# Patient Record
Sex: FEMALE | Race: WHITE | NOT HISPANIC OR LATINO | Employment: OTHER | URBAN - METROPOLITAN AREA
[De-identification: names, ages, dates, MRNs, and addresses within clinical notes are randomized per-mention and may not be internally consistent; named-entity substitution may affect disease eponyms.]

---

## 2018-09-25 ENCOUNTER — OFFICE VISIT (OUTPATIENT)
Dept: OBGYN CLINIC | Age: 53
End: 2018-09-25
Payer: MEDICARE

## 2018-09-25 VITALS
HEIGHT: 64 IN | DIASTOLIC BLOOD PRESSURE: 82 MMHG | SYSTOLIC BLOOD PRESSURE: 132 MMHG | BODY MASS INDEX: 30.48 KG/M2 | WEIGHT: 178.5 LBS

## 2018-09-25 DIAGNOSIS — Z12.31 ENCOUNTER FOR SCREENING MAMMOGRAM FOR MALIGNANT NEOPLASM OF BREAST: ICD-10-CM

## 2018-09-25 DIAGNOSIS — N95.1 CLIMACTERIC: ICD-10-CM

## 2018-09-25 DIAGNOSIS — Z01.411 ENCOUNTER FOR GYNECOLOGICAL EXAMINATION WITH ABNORMAL FINDING: Primary | ICD-10-CM

## 2018-09-25 PROBLEM — Z01.419 ENCOUNTER FOR GYNECOLOGICAL EXAMINATION WITHOUT ABNORMAL FINDING: Status: ACTIVE | Noted: 2018-09-25

## 2018-09-25 PROCEDURE — G0101 CA SCREEN;PELVIC/BREAST EXAM: HCPCS | Performed by: NURSE PRACTITIONER

## 2018-09-25 NOTE — PROGRESS NOTES
Assessment/Plan:    No problem-specific Assessment & Plan notes found for this encounter  Diagnoses and all orders for this visit:    Encounter for gynecological examination with abnormal finding    Climacteric    Encounter for screening mammogram for malignant neoplasm of breast  -     Mammo screening bilateral w 3d & cad; Future    Other orders  -     Naltrexone (VIVITROL IM); Inject into a muscle      Call as needed, encouraged calcium/vit D in her diet, all questions answered      Subjective:      Patient ID: Jimena Damon is a 47 y o  female  Pleasant 47 y o  menopausal female here with jessica for annual exam  She is suffering from intense hot flashes for which she has tried remifemin, clonidine, gabapentin, SSRIs  Would like to have her hormones tested  States she has gained 50 lbs since her hysterectomy which she had done for bleeding in 2014  She has seen her PCP for this and she states nothing was found  Denies history of abnormal pap smears, no further paps  Denies vaginal issues  Denies pelvic pain  Hx of DCIS with 3 surgeries  Has not had a mammo since 2014  The following portions of the patient's history were reviewed and updated as appropriate:   She  has a past medical history of Anxiety; Arthritis; Bleeding disorder ([address] ); Breast cancer ([address] ); Chicken pox; Colon cancer ([address] ); Depression; Endometriosis; History of blood transfusion; Migraines; and Seizures ([address] )  She   Patient Active Problem List    Diagnosis Date Noted    Climacteric 09/25/2018    Encounter for gynecological examination without abnormal finding 09/25/2018     She  has a past surgical history that includes Total hip arthroplasty; Dilation and curettage of uterus; Colectomy; Sinus surgery; Hysterectomy; and Breast surgery  Her family history includes Heart disease in her father  She  reports that she has been smoking Cigarettes  She has been smoking about 1 00 pack per day   She has never used smokeless tobacco  She reports that she does not drink alcohol or use drugs  Current Outpatient Prescriptions   Medication Sig Dispense Refill    Naltrexone (VIVITROL IM) Inject into a muscle       No current facility-administered medications for this visit  She is allergic to penicillins  OB History    Para Term  AB Living   3 3       2   SAB TAB Ectopic Multiple Live Births           3      # Outcome Date GA Lbr Jonas/2nd Weight Sex Delivery Anes PTL Lv   3 Para            2 Para            1 Para                 Disabled  Relocated from [facility]  Review of Systems   Constitutional: Negative for activity change, chills, fatigue, fever and unexpected weight change  HENT: Negative for mouth sores and trouble swallowing  Respiratory: Negative for shortness of breath  Gastrointestinal: Negative for anal bleeding, blood in stool, constipation and diarrhea  Genitourinary: Negative for difficulty urinating, dysuria, genital sores and hematuria  Neurological: Negative for weakness  Psychiatric/Behavioral: Negative for confusion and self-injury  Objective:      /82 (BP Location: Right arm, Patient Position: Sitting, Cuff Size: Standard)   Ht 5' 4" (1 626 m)   Wt 81 kg (178 lb 8 oz)   LMP  (LMP Unknown)   Breastfeeding? No   BMI 30 64 kg/m²          Physical Exam   Constitutional: She appears well-developed and well-nourished  No distress  HENT:   Head: Normocephalic  Neck: Normal range of motion  Neck supple  Pulmonary/Chest: Effort normal  Right breast exhibits no inverted nipple, no mass, no nipple discharge, no skin change and no tenderness  Left breast exhibits no inverted nipple, no mass, no nipple discharge, no skin change and no tenderness  Breasts are symmetrical    Abdominal: Soft  Genitourinary: No breast swelling, tenderness, discharge or bleeding  Pelvic exam was performed with patient supine  No labial fusion   There is no rash, tenderness, lesion or injury on the right labia  There is no rash, tenderness, lesion or injury on the left labia  Right adnexum displays no mass, no tenderness and no fullness  Left adnexum displays no mass, no tenderness and no fullness  No erythema or tenderness in the vagina  No foreign body in the vagina  No signs of injury around the vagina  No vaginal discharge found  Genitourinary Comments: Cx/uterus absent   Lymphadenopathy:        Right: No inguinal adenopathy present  Left: No inguinal adenopathy present

## 2018-09-25 NOTE — PATIENT INSTRUCTIONS
Menopause   WHAT YOU NEED TO KNOW:   What is menopause? Menopause is a normal stage in a woman's life when her monthly periods stop  Menopause starts when the ovaries slowly stop making the female hormones estrogen and progesterone  After menopause, a woman is no longer able to become pregnant  A woman who has not had a period for a full year after the age of 39 is considered to be in menopause  Perimenopause is a stage before menopause that may cause signs and symptoms similar to menopause  Perimenopause can last an average of 4 to 5 years  What are the signs and symptoms of menopause? The signs and symptoms of menopause can be different from woman to woman:  · Menstrual period changes such as skipped periods or periods that are closer together, or lighter or heavier than usual    · Hot flashes (feeling warm, flushed, and sweaty)     · Mood changes such as irritability or decreased desire to have sex    · Breast changes such as tenderness or pain    · Hair changes such as thinning hair or increased hair on your face    · Vaginal changes such as increased dryness     · Urinary changes such as increased urinary tract infections (UTIs) or urgency (feeling that you need to urinate right away)    · Other symptoms such as headaches, trouble sleeping, fatigue, or heart palpitations (strong, fast heartbeats)  What do I need to know about menopause? · You can still get pregnant while you have periods  Continue to use birth control if you do not want to get pregnant  You may need to use birth control until it has been 1 year since your periods stopped  Ask your healthcare provider when you can stop using birth control to prevent pregnancy  · Hormone replacement therapy can be used to treat symptoms of menopause  Hormone replacement therapy (HRT) is medicine that replaces your low hormone levels  HRT contains estrogen and sometimes progestin  HRT has benefits and risks   HRT decreases your risk for bone fractures by helping to prevent osteoporosis  HRT also protects you from colon cancer  HRT may increase your risk for breast cancer, blood clots, heart disease, and stroke  Ask your healthcare provider if HRT is right for you  How can I live a healthy lifestyle during and after menopause? After menopause, your risk for heart disease and bone loss increases  Ask about these and other ways to stay healthy:  · Exercise regularly  Exercise helps you maintain a healthy weight  Exercise can also help to control your blood pressure and cholesterol levels  Include weight-bearing exercise for strong bones  Ask your healthcare provider about the best exercise plan for you  · Eat a variety of healthy foods  Include fruits, vegetables, whole grains (whole-wheat bread, pasta, and cereals), low-fat dairy, and lean protein foods (beans, poultry, and fish)  Limit foods high in sodium (salt)  Ask your healthcare provider for more information about a meal plan that is right for you  · Maintain a healthy weight  Check with your healthcare provider before you start any weight loss program      · Take supplements as directed  You may need extra calcium and vitamin D to help prevent osteoporosis  · Limit alcohol and caffeine  Alcohol and caffeine may worsen your symptoms  · Do not smoke  If you smoke, it is never too late to quit  You are more likely to have a heart attack, lung disease, blood clots, and cancer if you smoke  Ask your healthcare provider for information if you need help quitting  When should I contact my healthcare provider? · You have vaginal bleeding after menopause  · You have questions or concerns about your condition or care  CARE AGREEMENT:   You have the right to help plan your care  Learn about your health condition and how it may be treated  Discuss treatment options with your caregivers to decide what care you want to receive  You always have the right to refuse treatment   The above information is an  only  It is not intended as medical advice for individual conditions or treatments  Talk to your doctor, nurse or pharmacist before following any medical regimen to see if it is safe and effective for you  © 2017 [address] Information is for End User's use only and may not be sold, redistributed or otherwise used for commercial purposes  All illustrations and images included in CareNotes® are the copyrighted property of A D [employer] M [employer]  or [employer]

## 2018-10-23 ENCOUNTER — HOSPITAL ENCOUNTER (OUTPATIENT)
Dept: MAMMOGRAPHY | Facility: CLINIC | Age: 53
Discharge: HOME | End: 2018-10-23
Payer: MEDICARE

## 2018-10-23 DIAGNOSIS — Z12.31 ENCOUNTER FOR SCREENING MAMMOGRAM FOR MALIGNANT NEOPLASM OF BREAST: ICD-10-CM

## 2018-10-23 PROCEDURE — 77063 BREAST TOMOSYNTHESIS BI: CPT

## 2018-10-23 PROCEDURE — 77067 SCR MAMMO BI INCL CAD: CPT

## 2018-10-29 PROCEDURE — 99284 EMERGENCY DEPT VISIT MOD MDM: CPT

## 2018-10-30 ENCOUNTER — APPOINTMENT (EMERGENCY)
Dept: CT IMAGING | Facility: HOSPITAL | Age: 53
End: 2018-10-30
Payer: MEDICARE

## 2018-10-30 ENCOUNTER — HOSPITAL ENCOUNTER (EMERGENCY)
Facility: HOSPITAL | Age: 53
Discharge: HOME | End: 2018-10-30
Attending: EMERGENCY MEDICINE | Admitting: EMERGENCY MEDICINE
Payer: MEDICARE

## 2018-10-30 VITALS
TEMPERATURE: 97.9 F | BODY MASS INDEX: 30.65 KG/M2 | RESPIRATION RATE: 18 BRPM | DIASTOLIC BLOOD PRESSURE: 95 MMHG | WEIGHT: 178.57 LBS | HEART RATE: 83 BPM | OXYGEN SATURATION: 98 % | SYSTOLIC BLOOD PRESSURE: 137 MMHG

## 2018-10-30 DIAGNOSIS — R51.9 HEADACHE: Primary | ICD-10-CM

## 2018-10-30 LAB
ANION GAP SERPL CALCULATED.3IONS-SCNC: 8 MMOL/L (ref 4–13)
APTT PPP: 33 SECONDS (ref 24–36)
ATRIAL RATE: 73 BPM
BASOPHILS # BLD AUTO: 0.07 THOUSANDS/ΜL (ref 0–0.1)
BASOPHILS NFR BLD AUTO: 1 % (ref 0–1)
BUN SERPL-MCNC: 13 MG/DL (ref 5–25)
CALCIUM SERPL-MCNC: 9.6 MG/DL (ref 8.3–10.1)
CHLORIDE SERPL-SCNC: 103 MMOL/L (ref 100–108)
CO2 SERPL-SCNC: 28 MMOL/L (ref 21–32)
CREAT SERPL-MCNC: 0.81 MG/DL (ref 0.6–1.3)
EOSINOPHIL # BLD AUTO: 0.59 THOUSAND/ΜL (ref 0–0.61)
EOSINOPHIL NFR BLD AUTO: 4 % (ref 0–6)
ERYTHROCYTE [DISTWIDTH] IN BLOOD BY AUTOMATED COUNT: 12.8 % (ref 11.6–15.1)
GFR SERPL CREATININE-BSD FRML MDRD: 84 ML/MIN/1.73SQ M
GLUCOSE SERPL-MCNC: 102 MG/DL (ref 65–140)
HCT VFR BLD AUTO: 44.8 % (ref 34.8–46.1)
HGB BLD-MCNC: 14.7 G/DL (ref 11.5–15.4)
IMM GRANULOCYTES # BLD AUTO: 0.06 THOUSAND/UL (ref 0–0.2)
IMM GRANULOCYTES NFR BLD AUTO: 0 % (ref 0–2)
INR PPP: 0.96 (ref 0.86–1.17)
LYMPHOCYTES # BLD AUTO: 5.58 THOUSANDS/ΜL (ref 0.6–4.47)
LYMPHOCYTES NFR BLD AUTO: 39 % (ref 14–44)
MCH RBC QN AUTO: 29.5 PG (ref 26.8–34.3)
MCHC RBC AUTO-ENTMCNC: 32.8 G/DL (ref 31.4–37.4)
MCV RBC AUTO: 90 FL (ref 82–98)
MONOCYTES # BLD AUTO: 0.92 THOUSAND/ΜL (ref 0.17–1.22)
MONOCYTES NFR BLD AUTO: 6 % (ref 4–12)
NEUTROPHILS # BLD AUTO: 7.23 THOUSANDS/ΜL (ref 1.85–7.62)
NEUTS SEG NFR BLD AUTO: 50 % (ref 43–75)
NRBC BLD AUTO-RTO: 0 /100 WBCS
P AXIS: 58 DEGREES
PLATELET # BLD AUTO: 307 THOUSANDS/UL (ref 149–390)
PMV BLD AUTO: 9.5 FL (ref 8.9–12.7)
POTASSIUM SERPL-SCNC: 3.9 MMOL/L (ref 3.5–5.3)
PR INTERVAL: 148 MS
PROTHROMBIN TIME: 12.7 SECONDS (ref 11.8–14.2)
QRS AXIS: 55 DEGREES
QRSD INTERVAL: 74 MS
QT INTERVAL: 422 MS
QTC INTERVAL: 464 MS
RBC # BLD AUTO: 4.98 MILLION/UL (ref 3.81–5.12)
SODIUM SERPL-SCNC: 139 MMOL/L (ref 136–145)
T WAVE AXIS: 65 DEGREES
VENTRICULAR RATE: 73 BPM
WBC # BLD AUTO: 14.45 THOUSAND/UL (ref 4.31–10.16)

## 2018-10-30 PROCEDURE — 85610 PROTHROMBIN TIME: CPT | Performed by: EMERGENCY MEDICINE

## 2018-10-30 PROCEDURE — 70450 CT HEAD/BRAIN W/O DYE: CPT

## 2018-10-30 PROCEDURE — 93010 ELECTROCARDIOGRAM REPORT: CPT | Performed by: INTERNAL MEDICINE

## 2018-10-30 PROCEDURE — 36415 COLL VENOUS BLD VENIPUNCTURE: CPT | Performed by: EMERGENCY MEDICINE

## 2018-10-30 PROCEDURE — 85025 COMPLETE CBC W/AUTO DIFF WBC: CPT | Performed by: EMERGENCY MEDICINE

## 2018-10-30 PROCEDURE — 96361 HYDRATE IV INFUSION ADD-ON: CPT

## 2018-10-30 PROCEDURE — 96375 TX/PRO/DX INJ NEW DRUG ADDON: CPT

## 2018-10-30 PROCEDURE — 80048 BASIC METABOLIC PNL TOTAL CA: CPT | Performed by: EMERGENCY MEDICINE

## 2018-10-30 PROCEDURE — 93005 ELECTROCARDIOGRAM TRACING: CPT

## 2018-10-30 PROCEDURE — 85730 THROMBOPLASTIN TIME PARTIAL: CPT | Performed by: EMERGENCY MEDICINE

## 2018-10-30 PROCEDURE — 96374 THER/PROPH/DIAG INJ IV PUSH: CPT

## 2018-10-30 PROCEDURE — 96372 THER/PROPH/DIAG INJ SC/IM: CPT

## 2018-10-30 RX ORDER — OLANZAPINE 10 MG/1
10 INJECTION, POWDER, LYOPHILIZED, FOR SOLUTION INTRAMUSCULAR ONCE
Status: COMPLETED | OUTPATIENT
Start: 2018-10-30 | End: 2018-10-30

## 2018-10-30 RX ORDER — DIPHENHYDRAMINE HYDROCHLORIDE 50 MG/ML
50 INJECTION INTRAMUSCULAR; INTRAVENOUS ONCE
Status: COMPLETED | OUTPATIENT
Start: 2018-10-30 | End: 2018-10-30

## 2018-10-30 RX ORDER — KETOROLAC TROMETHAMINE 30 MG/ML
15 INJECTION, SOLUTION INTRAMUSCULAR; INTRAVENOUS ONCE
Status: COMPLETED | OUTPATIENT
Start: 2018-10-30 | End: 2018-10-30

## 2018-10-30 RX ORDER — METOCLOPRAMIDE HYDROCHLORIDE 5 MG/ML
10 INJECTION INTRAMUSCULAR; INTRAVENOUS ONCE
Status: COMPLETED | OUTPATIENT
Start: 2018-10-30 | End: 2018-10-30

## 2018-10-30 RX ADMIN — SODIUM CHLORIDE 1000 ML: 0.9 INJECTION, SOLUTION INTRAVENOUS at 02:31

## 2018-10-30 RX ADMIN — KETOROLAC TROMETHAMINE 15 MG: 30 INJECTION, SOLUTION INTRAMUSCULAR at 02:23

## 2018-10-30 RX ADMIN — OLANZAPINE 10 MG: 10 INJECTION, POWDER, FOR SOLUTION INTRAMUSCULAR at 02:47

## 2018-10-30 RX ADMIN — METOCLOPRAMIDE 10 MG: 5 INJECTION, SOLUTION INTRAMUSCULAR; INTRAVENOUS at 02:24

## 2018-10-30 RX ADMIN — DIPHENHYDRAMINE HYDROCHLORIDE 50 MG: 50 INJECTION, SOLUTION INTRAMUSCULAR; INTRAVENOUS at 02:26

## 2018-10-30 NOTE — ED PROVIDER NOTES
History  Chief Complaint   Patient presents with    Headache     per pt-chronic severe head pain-worsening over the past 10 hours  No relief from prescribed medications  Pt notes small amount of blood from ears this morning  Gradual onset worsening of diffuse HA  Has HA every day for last 20 years  No relief from home "usual medications" for HA  No recent trauma  No neck stiffness  No neuro deficit or complaint  No fever  No numbness or weakness  Currently symptomatic  Previously obtained relief from ketamine infusions  Had been on narcotics for approx 15 years but recently discontinued use of narcotics and gets vivitrol injections  Headache   Associated symptoms: no dizziness, no fatigue, no fever, no numbness, no seizures and no weakness        Prior to Admission Medications   Prescriptions Last Dose Informant Patient Reported? Taking? Naltrexone (VIVITROL IM)   Yes No   Sig: Inject into a muscle      Facility-Administered Medications: None       Past Medical History:   Diagnosis Date    Anxiety     Arthritis     Bleeding disorder (HCC)     Breast cancer (HCC)     Chicken pox     [facility]     Depression     Endometriosis     History of blood transfusion     Migraines     Seizures ([facility] )        Past Surgical History:   Procedure Laterality Date    BREAST SURGERY      COLECTOMY      DILATION AND CURETTAGE OF UTERUS      HYSTERECTOMY      SINUS SURGERY      TOTAL HIP ARTHROPLASTY         Family History   Problem Relation Age of Onset    Heart disease Father      I have reviewed and agree with the history as documented  Social History   Substance Use Topics    Smoking status: Current Every Day Smoker     Packs/day: 1 00     Types: Cigarettes    Smokeless tobacco: Never Used    Alcohol use No        Review of Systems   Constitutional: Negative for activity change, fatigue and fever  Neurological: Positive for headaches   Negative for dizziness, tremors, seizures, syncope, facial asymmetry, speech difficulty, weakness, light-headedness and numbness  All other systems reviewed and are negative  Physical Exam  Physical Exam   Constitutional: She is oriented to person, place, and time  She appears well-developed and well-nourished  No distress  HENT:   Head: Normocephalic and atraumatic  Eyes: Pupils are equal, round, and reactive to light  Conjunctivae and EOM are normal    Neck: Normal range of motion  Neck supple  No JVD present  Cardiovascular: Normal rate, regular rhythm, normal heart sounds and intact distal pulses  Pulmonary/Chest: Effort normal and breath sounds normal  No stridor  Musculoskeletal: Normal range of motion  She exhibits no edema, tenderness or deformity  Neurological: She is alert and oriented to person, place, and time  No cranial nerve deficit or sensory deficit  She exhibits normal muscle tone  Coordination normal    Skin: Skin is warm and dry  Capillary refill takes less than 2 seconds  No rash noted  She is not diaphoretic  No erythema  No pallor  Nursing note and vitals reviewed        Vital Signs  ED Triage Vitals [10/29/18 2147]   Temperature Pulse Respirations Blood Pressure SpO2   97 9 °F (36 6 °C) 85 18 133/91 98 %      Temp Source Heart Rate Source Patient Position - Orthostatic VS BP Location FiO2 (%)   Oral Monitor Sitting Left arm --      Pain Score       Worst Possible Pain           Vitals:    10/29/18 2147 10/30/18 0214 10/30/18 0232 10/30/18 0336   BP: 133/91 128/86 128/86 137/95   Pulse: 85 74 81 83   Patient Position - Orthostatic VS: Sitting Lying Lying Lying       Visual Acuity  Visual Acuity      Most Recent Value   L Pupil Size (mm)  2   R Pupil Size (mm)  2          ED Medications  Medications   sodium chloride 0 9 % bolus 1,000 mL (0 mL Intravenous Stopped 10/30/18 0402)   metoclopramide (REGLAN) injection 10 mg (10 mg Intravenous Given 10/30/18 0224)   diphenhydrAMINE (BENADRYL) injection 50 mg (50 mg Intravenous Given 10/30/18 0226)   ketorolac (TORADOL) injection 15 mg (15 mg Intravenous Given 10/30/18 0223)   OLANZapine (ZyPREXA) IM injection 10 mg (10 mg Intramuscular Given 10/30/18 0247)       Diagnostic Studies  Results Reviewed     Procedure Component Value Units Date/Time    Protime-INR [[id number]]  (Normal) Collected:  10/30/18 0206    Lab Status:  Final result Specimen:  Blood from Arm, Right Updated:  10/30/18 0224     Protime 12 7 seconds      INR 0 96    APTT [[id number]]  (Normal) Collected:  10/30/18 0206    Lab Status:  Final result Specimen:  Blood from Arm, Right Updated:  10/30/18 0224     PTT 33 seconds     Basic metabolic panel [[id number]] Collected:  10/30/18 0206    Lab Status:  Final result Specimen:  Blood from Arm, Right Updated:  10/30/18 3356     Sodium 139 mmol/L      Potassium 3 9 mmol/L      Chloride 103 mmol/L      CO2 28 mmol/L      ANION GAP 8 mmol/L      BUN 13 mg/dL      Creatinine 0 81 mg/dL      Glucose 102 mg/dL      Calcium 9 6 mg/dL      eGFR 84 ml/min/1 73sq m     Narrative:         National Kidney Disease Education Program recommendations are as follows:  GFR calculation is accurate only with a steady state creatinine  Chronic Kidney disease less than 60 ml/min/1 73 sq  meters  Kidney failure less than 15 ml/min/1 73 sq  meters      CBC and differential [[id number]]  (Abnormal) Collected:  10/30/18 0206    Lab Status:  Final result Specimen:  Blood from Arm, Right Updated:  10/30/18 0212     WBC 14 45 (H) Thousand/uL      RBC 4 98 Million/uL      Hemoglobin 14 7 g/dL      Hematocrit 44 8 %      MCV 90 fL      MCH 29 5 pg      MCHC 32 8 g/dL      RDW 12 8 %      MPV 9 5 fL      Platelets 684 Thousands/uL      nRBC 0 /100 WBCs      Neutrophils Relative 50 %      Immat GRANS % 0 %      Lymphocytes Relative 39 %      Monocytes Relative 6 %      Eosinophils Relative 4 %      Basophils Relative 1 %      Neutrophils Absolute 7 23 Thousands/µL      Immature Grans Absolute 0 06 Thousand/uL      Lymphocytes Absolute 5 58 (H) Thousands/µL      Monocytes Absolute 0 92 Thousand/µL      Eosinophils Absolute 0 59 Thousand/µL      Basophils Absolute 0 07 Thousands/µL                  CT head without contrast   Final Result by Ladan Patel DO (10/30 0141)      No acute intracranial abnormality  Chronic sinus disease                  Workstation performed: [id number]                    Procedures  Procedures       Phone Contacts  ED Phone Contact    ED Course                               MDM  Number of Diagnoses or Management Options  Headache:   Diagnosis management comments: Recurrent and refractory HA  Ongoing x approx 20 years  No FND  CT head without acute pathology  Given chronicity of HA, low concern for acute intracranial process  Pt requesting d/c home after IV meds for symptomatic relief  Discharged home in stable condition  Amount and/or Complexity of Data Reviewed  Clinical lab tests: ordered and reviewed  Tests in the radiology section of CPT®: reviewed and ordered  Tests in the medicine section of CPT®: reviewed and ordered  Obtain history from someone other than the patient: yes  Independent visualization of images, tracings, or specimens: yes      CritCare Time    Disposition  Final diagnoses:   Headache     Time reflects when diagnosis was documented in both MDM as applicable and the Disposition within this note     Time User Action Codes Description Comment    10/30/2018  3:56 AM Silvana York Add [R51] Headache       ED Disposition     ED Disposition Condition Comment    Discharge  Kisha Mares discharge to home/self care  Condition at discharge: Stable        Follow-up Information    None         Discharge Medication List as of 10/30/2018  3:56 AM      CONTINUE these medications which have NOT CHANGED    Details   Naltrexone (VIVITROL IM) Inject into a muscle, Historical Med           No discharge procedures on file      ED Provider  Electronically Signed by           Annmarie Griggs MD  10/30/18 0856

## 2018-10-30 NOTE — DISCHARGE INSTRUCTIONS
Acute Headache, Ambulatory Care   GENERAL INFORMATION:   An acute headache  is pain or discomfort that starts suddenly and gets worse quickly  The cause of an acute headache may not be known  It may be triggered by stress, fatigue, hormones, food, or trauma  Common related symptoms include the following:   · Fever    · Sinus pressure    · Loss of memory    · Nausea or vomiting    · Problems with your vision, such as watery or red eyes, loss of vision, or pain in bright light    · Stiff neck    · Tenderness of the head and neck area    · Trouble staying awake, or being less alert than usual     · Weakness or less energy  Seek immediate care for the following symptoms:   · Severe pain    · A headache that occurs after a blow to the head, a fall, or other trauma     · Confusion or forgetfulness    · Numbness on one side of your face or body  Treatment for an acute headache  may include medicine to decrease pain  You may also need biofeedback or cognitive behavioral therapy  Ask your healthcare provider about these and other treatments for an acute headache  Manage my symptoms:   · Apply heat  on your head for 20 to 30 minutes every 2 hours for as many days as directed  Heat helps decrease pain and muscle spasms  You may alternate heat and ice  · Apply ice  on your head for 15 to 20 minutes every hour or as directed  Use an ice pack, or put crushed ice in a plastic bag  Cover it with a towel  Ice helps decrease pain  · Relax your muscles  Lie down in a comfortable position and close your eyes  Relax your muscles slowly  Start at your toes and work your way up your body  · Keep a record of your headaches  Write down when your headaches start and stop  Include your symptoms and what you were doing when the headache began  Record what you ate or drank for 24 hours before the headache started  Describe the pain and where it hurts  Keep track of what you did to treat your headache and whether it worked    Follow up with your healthcare provider as directed:  Bring your headache record with you when you see your healthcare provider  Write down your questions so you remember to ask them during your visits  CARE AGREEMENT:   You have the right to help plan your care  Learn about your health condition and how it may be treated  Discuss treatment options with your caregivers to decide what care you want to receive  You always have the right to refuse treatment  The above information is an  only  It is not intended as medical advice for individual conditions or treatments  Talk to your doctor, nurse or pharmacist before following any medical regimen to see if it is safe and effective for you  © 2014 [address] is for End User's use only and may not be sold, redistributed or otherwise used for commercial purposes  All illustrations and images included in CareNotes® are the copyrighted property of A D A M [employer]  or Will Melton

## 2018-10-31 ENCOUNTER — TELEPHONE (OUTPATIENT)
Dept: OBGYN CLINIC | Facility: CLINIC | Age: 53
End: 2018-10-31

## 2018-10-31 ENCOUNTER — TRANSCRIBE ORDERS (OUTPATIENT)
Dept: MAMMOGRAPHY | Facility: CLINIC | Age: 53
End: 2018-10-31

## 2018-10-31 ENCOUNTER — HOSPITAL ENCOUNTER (OUTPATIENT)
Dept: MAMMOGRAPHY | Facility: CLINIC | Age: 53
Discharge: HOME | End: 2018-10-31
Payer: MEDICARE

## 2018-10-31 ENCOUNTER — HOSPITAL ENCOUNTER (OUTPATIENT)
Dept: ULTRASOUND IMAGING | Facility: CLINIC | Age: 53
Discharge: HOME | End: 2018-10-31
Payer: MEDICARE

## 2018-10-31 DIAGNOSIS — R92.2 DENSE BREAST TISSUE: Primary | ICD-10-CM

## 2018-10-31 DIAGNOSIS — R92.8 ABNORMAL MAMMOGRAM: ICD-10-CM

## 2018-10-31 DIAGNOSIS — R92.8 MAMMOGRAM ABNORMAL: Primary | ICD-10-CM

## 2018-10-31 PROCEDURE — G0279 TOMOSYNTHESIS, MAMMO: HCPCS

## 2018-10-31 PROCEDURE — 77065 DX MAMMO INCL CAD UNI: CPT

## 2018-10-31 PROCEDURE — 76642 ULTRASOUND BREAST LIMITED: CPT

## 2018-10-31 NOTE — TELEPHONE ENCOUNTER
Patient is aware of diagnostic mammo results  She has the f/u appointment scheduled already  Mailed orders to the pt

## 2018-10-31 NOTE — TELEPHONE ENCOUNTER
----- Message from Paty Fernando, [address] sent at 10/31/2018  2:36 PM EDT -----  Please advise pt her breast imaging seems benign and 6 mos f/u was recommended  Thanks

## 2019-03-18 ENCOUNTER — TRANSCRIBE ORDERS (OUTPATIENT)
Dept: ADMINISTRATIVE | Facility: HOSPITAL | Age: 54
End: 2019-03-18

## 2019-03-18 DIAGNOSIS — R63.5 WEIGHT GAIN: ICD-10-CM

## 2019-03-18 DIAGNOSIS — J44.9 CHRONIC OBSTRUCTIVE PULMONARY DISEASE, UNSPECIFIED COPD TYPE (HCC): Primary | ICD-10-CM

## 2019-03-18 DIAGNOSIS — R05.3 CHRONIC COUGH: ICD-10-CM

## 2019-03-18 DIAGNOSIS — R10.11 RIGHT UPPER QUADRANT PAIN: ICD-10-CM

## 2019-03-18 DIAGNOSIS — I10 ESSENTIAL HYPERTENSION, MALIGNANT: ICD-10-CM

## 2019-03-18 DIAGNOSIS — R10.33 UMBILICAL PAIN: ICD-10-CM

## 2019-03-18 DIAGNOSIS — F17.200 SMOKER: ICD-10-CM

## 2019-03-18 DIAGNOSIS — E66.09 EXOGENOUS OBESITY: ICD-10-CM

## 2019-03-20 ENCOUNTER — APPOINTMENT (OUTPATIENT)
Dept: LAB | Facility: CLINIC | Age: 54
End: 2019-03-20
Payer: MEDICARE

## 2019-03-20 ENCOUNTER — HOSPITAL ENCOUNTER (OUTPATIENT)
Dept: ULTRASOUND IMAGING | Facility: CLINIC | Age: 54
Discharge: HOME | End: 2019-03-20
Payer: MEDICARE

## 2019-03-20 ENCOUNTER — APPOINTMENT (OUTPATIENT)
Dept: RADIOLOGY | Facility: CLINIC | Age: 54
End: 2019-03-20
Payer: MEDICARE

## 2019-03-20 DIAGNOSIS — R63.5 WEIGHT GAIN: ICD-10-CM

## 2019-03-20 DIAGNOSIS — R10.11 RIGHT UPPER QUADRANT PAIN: ICD-10-CM

## 2019-03-20 DIAGNOSIS — I10 ESSENTIAL HYPERTENSION, MALIGNANT: ICD-10-CM

## 2019-03-20 DIAGNOSIS — E66.09 EXOGENOUS OBESITY: ICD-10-CM

## 2019-03-20 DIAGNOSIS — F17.200 SMOKER: ICD-10-CM

## 2019-03-20 DIAGNOSIS — R10.33 UMBILICAL PAIN: ICD-10-CM

## 2019-03-20 DIAGNOSIS — J44.9 CHRONIC OBSTRUCTIVE PULMONARY DISEASE, UNSPECIFIED COPD TYPE (HCC): ICD-10-CM

## 2019-03-20 DIAGNOSIS — R05.3 CHRONIC COUGH: ICD-10-CM

## 2019-03-20 LAB
ALBUMIN SERPL BCP-MCNC: 4.1 G/DL (ref 3.5–5)
ALP SERPL-CCNC: 102 U/L (ref 46–116)
ALT SERPL W P-5'-P-CCNC: 19 U/L (ref 12–78)
ANION GAP SERPL CALCULATED.3IONS-SCNC: 4 MMOL/L (ref 4–13)
AST SERPL W P-5'-P-CCNC: 11 U/L (ref 5–45)
BACTERIA UR QL AUTO: NORMAL /HPF
BASOPHILS # BLD AUTO: 0.04 THOUSANDS/ΜL (ref 0–0.1)
BASOPHILS NFR BLD AUTO: 0 % (ref 0–1)
BILIRUB SERPL-MCNC: 0.34 MG/DL (ref 0.2–1)
BILIRUB UR QL STRIP: NEGATIVE
BUN SERPL-MCNC: 15 MG/DL (ref 5–25)
CALCIUM SERPL-MCNC: 9 MG/DL (ref 8.3–10.1)
CHLORIDE SERPL-SCNC: 108 MMOL/L (ref 100–108)
CHOLEST SERPL-MCNC: 181 MG/DL (ref 50–200)
CLARITY UR: CLEAR
CO2 SERPL-SCNC: 28 MMOL/L (ref 21–32)
COLOR UR: YELLOW
CREAT SERPL-MCNC: 0.74 MG/DL (ref 0.6–1.3)
EOSINOPHIL # BLD AUTO: 0.45 THOUSAND/ΜL (ref 0–0.61)
EOSINOPHIL NFR BLD AUTO: 4 % (ref 0–6)
ERYTHROCYTE [DISTWIDTH] IN BLOOD BY AUTOMATED COUNT: 13.2 % (ref 11.6–15.1)
GFR SERPL CREATININE-BSD FRML MDRD: 93 ML/MIN/1.73SQ M
GLUCOSE P FAST SERPL-MCNC: 97 MG/DL (ref 65–99)
GLUCOSE UR STRIP-MCNC: NEGATIVE MG/DL
HCT VFR BLD AUTO: 47.2 % (ref 34.8–46.1)
HDLC SERPL-MCNC: 42 MG/DL (ref 40–60)
HGB BLD-MCNC: 14.7 G/DL (ref 11.5–15.4)
HGB UR QL STRIP.AUTO: ABNORMAL
HYALINE CASTS #/AREA URNS LPF: NORMAL /LPF
IMM GRANULOCYTES # BLD AUTO: 0.05 THOUSAND/UL (ref 0–0.2)
IMM GRANULOCYTES NFR BLD AUTO: 0 % (ref 0–2)
KETONES UR STRIP-MCNC: NEGATIVE MG/DL
LDLC SERPL CALC-MCNC: 120 MG/DL (ref 0–100)
LEUKOCYTE ESTERASE UR QL STRIP: NEGATIVE
LYMPHOCYTES # BLD AUTO: 3.68 THOUSANDS/ΜL (ref 0.6–4.47)
LYMPHOCYTES NFR BLD AUTO: 33 % (ref 14–44)
MCH RBC QN AUTO: 28.7 PG (ref 26.8–34.3)
MCHC RBC AUTO-ENTMCNC: 31.1 G/DL (ref 31.4–37.4)
MCV RBC AUTO: 92 FL (ref 82–98)
MONOCYTES # BLD AUTO: 0.98 THOUSAND/ΜL (ref 0.17–1.22)
MONOCYTES NFR BLD AUTO: 9 % (ref 4–12)
NEUTROPHILS # BLD AUTO: 5.98 THOUSANDS/ΜL (ref 1.85–7.62)
NEUTS SEG NFR BLD AUTO: 54 % (ref 43–75)
NITRITE UR QL STRIP: NEGATIVE
NON-SQ EPI CELLS URNS QL MICRO: NORMAL /HPF
NONHDLC SERPL-MCNC: 139 MG/DL
NRBC BLD AUTO-RTO: 0 /100 WBCS
PH UR STRIP.AUTO: 6 [PH]
PLATELET # BLD AUTO: 272 THOUSANDS/UL (ref 149–390)
PMV BLD AUTO: 10.5 FL (ref 8.9–12.7)
POTASSIUM SERPL-SCNC: 4.2 MMOL/L (ref 3.5–5.3)
PROT SERPL-MCNC: 7.5 G/DL (ref 6.4–8.2)
PROT UR STRIP-MCNC: NEGATIVE MG/DL
RBC # BLD AUTO: 5.12 MILLION/UL (ref 3.81–5.12)
RBC #/AREA URNS AUTO: NORMAL /HPF
SODIUM SERPL-SCNC: 140 MMOL/L (ref 136–145)
SP GR UR STRIP.AUTO: 1.02 (ref 1–1.03)
TRIGL SERPL-MCNC: 93 MG/DL
TSH SERPL DL<=0.05 MIU/L-ACNC: 1.3 UIU/ML (ref 0.36–3.74)
UROBILINOGEN UR QL STRIP.AUTO: 0.2 E.U./DL
WBC # BLD AUTO: 11.18 THOUSAND/UL (ref 4.31–10.16)
WBC #/AREA URNS AUTO: NORMAL /HPF

## 2019-03-20 PROCEDURE — 36415 COLL VENOUS BLD VENIPUNCTURE: CPT

## 2019-03-20 PROCEDURE — 84443 ASSAY THYROID STIM HORMONE: CPT

## 2019-03-20 PROCEDURE — 81001 URINALYSIS AUTO W/SCOPE: CPT | Performed by: FAMILY MEDICINE

## 2019-03-20 PROCEDURE — 80053 COMPREHEN METABOLIC PANEL: CPT

## 2019-03-20 PROCEDURE — 85025 COMPLETE CBC W/AUTO DIFF WBC: CPT

## 2019-03-20 PROCEDURE — 71046 X-RAY EXAM CHEST 2 VIEWS: CPT

## 2019-03-20 PROCEDURE — 80061 LIPID PANEL: CPT

## 2019-03-20 PROCEDURE — 76705 ECHO EXAM OF ABDOMEN: CPT

## 2019-05-02 ENCOUNTER — OFFICE VISIT (OUTPATIENT)
Dept: FAMILY MEDICINE CLINIC | Facility: CLINIC | Age: 54
End: 2019-05-02
Payer: MEDICARE

## 2019-05-02 ENCOUNTER — TELEPHONE (OUTPATIENT)
Dept: FAMILY MEDICINE CLINIC | Facility: CLINIC | Age: 54
End: 2019-05-02

## 2019-05-02 VITALS
OXYGEN SATURATION: 97 % | WEIGHT: 192 LBS | RESPIRATION RATE: 16 BRPM | HEART RATE: 93 BPM | TEMPERATURE: 98 F | HEIGHT: 64 IN | BODY MASS INDEX: 32.78 KG/M2 | DIASTOLIC BLOOD PRESSURE: 80 MMHG | SYSTOLIC BLOOD PRESSURE: 130 MMHG

## 2019-05-02 DIAGNOSIS — D72.829 LEUKOCYTOSIS, UNSPECIFIED TYPE: ICD-10-CM

## 2019-05-02 DIAGNOSIS — G44.309 HEADACHES DUE TO OLD HEAD INJURY: ICD-10-CM

## 2019-05-02 DIAGNOSIS — C50.911 BILATERAL MALIGNANT NEOPLASM OF BREAST IN FEMALE, UNSPECIFIED ESTROGEN RECEPTOR STATUS, UNSPECIFIED SITE OF BREAST (HCC): ICD-10-CM

## 2019-05-02 DIAGNOSIS — R63.5 WEIGHT GAIN: ICD-10-CM

## 2019-05-02 DIAGNOSIS — Z72.0 TOBACCO USE: ICD-10-CM

## 2019-05-02 DIAGNOSIS — R32 URINARY INCONTINENCE, UNSPECIFIED TYPE: ICD-10-CM

## 2019-05-02 DIAGNOSIS — S09.90XS HEADACHES DUE TO OLD HEAD INJURY: ICD-10-CM

## 2019-05-02 DIAGNOSIS — R31.9 HEMATURIA, UNSPECIFIED TYPE: ICD-10-CM

## 2019-05-02 DIAGNOSIS — Z85.038 HISTORY OF COLON CANCER: Primary | ICD-10-CM

## 2019-05-02 DIAGNOSIS — R05.9 COUGH: ICD-10-CM

## 2019-05-02 DIAGNOSIS — J01.90 SUBACUTE SINUSITIS, UNSPECIFIED LOCATION: ICD-10-CM

## 2019-05-02 DIAGNOSIS — Z00.00 MEDICARE ANNUAL WELLNESS VISIT, SUBSEQUENT: ICD-10-CM

## 2019-05-02 DIAGNOSIS — C50.912 BILATERAL MALIGNANT NEOPLASM OF BREAST IN FEMALE, UNSPECIFIED ESTROGEN RECEPTOR STATUS, UNSPECIFIED SITE OF BREAST (HCC): ICD-10-CM

## 2019-05-02 DIAGNOSIS — R10.11 RIGHT UPPER QUADRANT ABDOMINAL PAIN: ICD-10-CM

## 2019-05-02 PROCEDURE — G0439 PPPS, SUBSEQ VISIT: HCPCS | Performed by: FAMILY MEDICINE

## 2019-05-02 PROCEDURE — 99205 OFFICE O/P NEW HI 60 MIN: CPT | Performed by: FAMILY MEDICINE

## 2019-05-02 RX ORDER — CEFUROXIME AXETIL 500 MG/1
500 TABLET ORAL
Qty: 60 TABLET | Refills: 0 | Status: SHIPPED | OUTPATIENT
Start: 2019-05-02 | End: 2019-06-01

## 2019-05-06 ENCOUNTER — HOSPITAL ENCOUNTER (OUTPATIENT)
Dept: MAMMOGRAPHY | Facility: CLINIC | Age: 54
Discharge: HOME | End: 2019-05-06
Payer: MEDICARE

## 2019-05-06 ENCOUNTER — HOSPITAL ENCOUNTER (OUTPATIENT)
Dept: ULTRASOUND IMAGING | Facility: CLINIC | Age: 54
Discharge: HOME | End: 2019-05-06
Payer: MEDICARE

## 2019-05-06 VITALS — BODY MASS INDEX: 34.15 KG/M2 | HEIGHT: 64 IN | WEIGHT: 200 LBS

## 2019-05-06 DIAGNOSIS — R92.2 DENSE BREAST TISSUE: ICD-10-CM

## 2019-05-06 DIAGNOSIS — R92.8 ABNORMAL MAMMOGRAM: ICD-10-CM

## 2019-05-06 DIAGNOSIS — R92.8 MAMMOGRAM ABNORMAL: ICD-10-CM

## 2019-05-06 PROCEDURE — 77065 DX MAMMO INCL CAD UNI: CPT

## 2019-05-06 PROCEDURE — G0279 TOMOSYNTHESIS, MAMMO: HCPCS

## 2019-05-06 PROCEDURE — 76642 ULTRASOUND BREAST LIMITED: CPT

## 2019-05-07 ENCOUNTER — TELEPHONE (OUTPATIENT)
Dept: OBGYN CLINIC | Facility: CLINIC | Age: 54
End: 2019-05-07

## 2019-05-07 ENCOUNTER — OFFICE VISIT (OUTPATIENT)
Dept: UROLOGY | Facility: AMBULATORY SURGERY CENTER | Age: 54
End: 2019-05-07
Payer: MEDICARE

## 2019-05-07 VITALS
HEART RATE: 99 BPM | SYSTOLIC BLOOD PRESSURE: 126 MMHG | WEIGHT: 200 LBS | BODY MASS INDEX: 34.15 KG/M2 | DIASTOLIC BLOOD PRESSURE: 78 MMHG | HEIGHT: 64 IN

## 2019-05-07 DIAGNOSIS — R31.9 HEMATURIA, UNSPECIFIED TYPE: ICD-10-CM

## 2019-05-07 DIAGNOSIS — R32 URINARY INCONTINENCE, UNSPECIFIED TYPE: ICD-10-CM

## 2019-05-07 DIAGNOSIS — D05.10 DUCTAL CARCINOMA IN SITU (DCIS) OF BREAST, UNSPECIFIED LATERALITY: Primary | ICD-10-CM

## 2019-05-07 LAB
POST-VOID RESIDUAL VOLUME, ML POC: 0 ML
SL AMB  POCT GLUCOSE, UA: NORMAL
SL AMB LEUKOCYTE ESTERASE,UA: NORMAL
SL AMB POCT BILIRUBIN,UA: NORMAL
SL AMB POCT BLOOD,UA: NORMAL
SL AMB POCT CLARITY,UA: CLEAR
SL AMB POCT COLOR,UA: YELLOW
SL AMB POCT KETONES,UA: NORMAL
SL AMB POCT NITRITE,UA: NORMAL
SL AMB POCT PH,UA: 6
SL AMB POCT SPECIFIC GRAVITY,UA: 1.01
SL AMB POCT URINE PROTEIN: NORMAL
SL AMB POCT UROBILINOGEN: 0.2

## 2019-05-07 PROCEDURE — 99204 OFFICE O/P NEW MOD 45 MIN: CPT | Performed by: UROLOGY

## 2019-05-07 PROCEDURE — 81002 URINALYSIS NONAUTO W/O SCOPE: CPT | Performed by: UROLOGY

## 2019-05-07 PROCEDURE — 51798 US URINE CAPACITY MEASURE: CPT | Performed by: UROLOGY

## 2019-05-07 RX ORDER — LEVOFLOXACIN 500 MG/1
500 TABLET, FILM COATED ORAL EVERY 24 HOURS
COMMUNITY
End: 2019-09-09

## 2019-05-23 ENCOUNTER — HOSPITAL ENCOUNTER (OUTPATIENT)
Dept: RADIOLOGY | Facility: HOSPITAL | Age: 54
Discharge: HOME | End: 2019-05-23
Attending: FAMILY MEDICINE
Payer: MEDICARE

## 2019-05-23 ENCOUNTER — HOSPITAL ENCOUNTER (OUTPATIENT)
Dept: MRI IMAGING | Facility: HOSPITAL | Age: 54
Discharge: HOME | End: 2019-05-23
Payer: MEDICARE

## 2019-05-23 DIAGNOSIS — R05.9 COUGH: ICD-10-CM

## 2019-05-23 DIAGNOSIS — R93.89 ABNORMAL CHEST X-RAY: Primary | ICD-10-CM

## 2019-05-23 DIAGNOSIS — D05.10 DUCTAL CARCINOMA IN SITU (DCIS) OF BREAST, UNSPECIFIED LATERALITY: ICD-10-CM

## 2019-05-23 DIAGNOSIS — Z72.0 TOBACCO USE: ICD-10-CM

## 2019-05-23 PROCEDURE — C8937 CAD BREAST MRI: HCPCS

## 2019-05-23 PROCEDURE — 71046 X-RAY EXAM CHEST 2 VIEWS: CPT

## 2019-05-23 PROCEDURE — A9585 GADOBUTROL INJECTION: HCPCS | Performed by: NURSE PRACTITIONER

## 2019-05-23 PROCEDURE — C8908 MRI W/O FOL W/CONT, BREAST,: HCPCS

## 2019-05-23 RX ADMIN — GADOBUTROL 8 ML: 604.72 INJECTION INTRAVENOUS at 14:54

## 2019-05-24 ENCOUNTER — TELEPHONE (OUTPATIENT)
Dept: FAMILY MEDICINE CLINIC | Facility: CLINIC | Age: 54
End: 2019-05-24

## 2019-05-29 ENCOUNTER — HOSPITAL ENCOUNTER (OUTPATIENT)
Dept: ULTRASOUND IMAGING | Facility: CLINIC | Age: 54
Discharge: HOME | End: 2019-05-29
Admitting: RADIOLOGY
Payer: MEDICARE

## 2019-05-29 ENCOUNTER — HOSPITAL ENCOUNTER (OUTPATIENT)
Dept: ULTRASOUND IMAGING | Facility: CLINIC | Age: 54
Discharge: HOME | End: 2019-05-29
Payer: MEDICARE

## 2019-05-29 DIAGNOSIS — R92.8 ABNORMAL MRI, BREAST: ICD-10-CM

## 2019-05-29 PROCEDURE — 19083 BX BREAST 1ST LESION US IMAG: CPT

## 2019-05-29 PROCEDURE — 76642 ULTRASOUND BREAST LIMITED: CPT

## 2019-05-29 RX ORDER — LIDOCAINE HYDROCHLORIDE 10 MG/ML
5 INJECTION, SOLUTION INFILTRATION; PERINEURAL ONCE
Status: DISCONTINUED | OUTPATIENT
Start: 2019-05-29 | End: 2019-06-02 | Stop reason: HOSPADM

## 2019-05-31 ENCOUNTER — HOSPITAL ENCOUNTER (OUTPATIENT)
Dept: CT IMAGING | Facility: HOSPITAL | Age: 54
Discharge: HOME | End: 2019-05-31
Attending: FAMILY MEDICINE
Payer: MEDICARE

## 2019-05-31 DIAGNOSIS — R05.9 COUGH: ICD-10-CM

## 2019-05-31 DIAGNOSIS — R93.89 ABNORMAL CHEST X-RAY: ICD-10-CM

## 2019-05-31 DIAGNOSIS — Z72.0 TOBACCO USE: ICD-10-CM

## 2019-05-31 PROCEDURE — 71250 CT THORAX DX C-: CPT

## 2019-06-03 ENCOUNTER — OFFICE VISIT (OUTPATIENT)
Dept: FAMILY MEDICINE CLINIC | Facility: CLINIC | Age: 54
End: 2019-06-03
Payer: MEDICARE

## 2019-06-03 VITALS
HEIGHT: 64 IN | SYSTOLIC BLOOD PRESSURE: 124 MMHG | HEART RATE: 82 BPM | DIASTOLIC BLOOD PRESSURE: 80 MMHG | BODY MASS INDEX: 33.63 KG/M2 | OXYGEN SATURATION: 98 % | RESPIRATION RATE: 16 BRPM | WEIGHT: 197 LBS | TEMPERATURE: 98.5 F

## 2019-06-03 DIAGNOSIS — S09.90XS HEADACHES DUE TO OLD HEAD INJURY: ICD-10-CM

## 2019-06-03 DIAGNOSIS — J32.9 CHRONIC SINUSITIS, UNSPECIFIED LOCATION: Primary | ICD-10-CM

## 2019-06-03 DIAGNOSIS — R63.5 WEIGHT GAIN: ICD-10-CM

## 2019-06-03 DIAGNOSIS — R05.9 COUGH: ICD-10-CM

## 2019-06-03 DIAGNOSIS — G44.309 HEADACHES DUE TO OLD HEAD INJURY: ICD-10-CM

## 2019-06-03 DIAGNOSIS — R93.89 ABNORMAL CHEST X-RAY: ICD-10-CM

## 2019-06-03 DIAGNOSIS — R92.8 ABNORMAL MAMMOGRAM: ICD-10-CM

## 2019-06-03 PROCEDURE — 99214 OFFICE O/P EST MOD 30 MIN: CPT | Performed by: FAMILY MEDICINE

## 2019-06-03 RX ORDER — FLUTICASONE PROPIONATE 50 MCG
2 SPRAY, SUSPENSION (ML) NASAL DAILY
Qty: 1 BOTTLE | Refills: 5 | Status: SHIPPED | OUTPATIENT
Start: 2019-06-03 | End: 2019-09-09

## 2019-06-03 RX ORDER — PREDNISONE 10 MG/1
TABLET ORAL
Qty: 30 TABLET | Refills: 0 | Status: SHIPPED | OUTPATIENT
Start: 2019-06-03 | End: 2019-09-09

## 2019-06-20 ENCOUNTER — TELEPHONE (OUTPATIENT)
Dept: UROLOGY | Facility: AMBULATORY SURGERY CENTER | Age: 54
End: 2019-06-20

## 2019-06-25 ENCOUNTER — TRANSCRIBE ORDERS (OUTPATIENT)
Dept: ADMINISTRATIVE | Facility: HOSPITAL | Age: 54
End: 2019-06-25

## 2019-06-25 ENCOUNTER — APPOINTMENT (OUTPATIENT)
Dept: LAB | Facility: CLINIC | Age: 54
End: 2019-06-25
Payer: MEDICARE

## 2019-06-25 DIAGNOSIS — E88.81 DYSMETABOLIC SYNDROME X: ICD-10-CM

## 2019-06-25 DIAGNOSIS — E66.09 EXOGENOUS OBESITY: ICD-10-CM

## 2019-06-25 DIAGNOSIS — I10 ESSENTIAL HYPERTENSION, MALIGNANT: Primary | ICD-10-CM

## 2019-06-25 DIAGNOSIS — N20.0 URIC ACID NEPHROLITHIASIS: ICD-10-CM

## 2019-06-25 DIAGNOSIS — J32.9 CHRONIC SINUSITIS, UNSPECIFIED LOCATION: ICD-10-CM

## 2019-06-25 DIAGNOSIS — E28.310 SYMPTOMATIC PREMATURE MENOPAUSE: ICD-10-CM

## 2019-06-25 DIAGNOSIS — I10 ESSENTIAL HYPERTENSION, MALIGNANT: ICD-10-CM

## 2019-06-25 LAB
EST. AVERAGE GLUCOSE BLD GHB EST-MCNC: 105 MG/DL
FSH SERPL-ACNC: 50.9 MIU/ML
HBA1C MFR BLD: 5.3 % (ref 4.2–6.3)
LH SERPL-ACNC: 31.7 MIU/ML
PTH-INTACT SERPL-MCNC: 73.8 PG/ML (ref 18.4–80.1)
T4 FREE SERPL-MCNC: 1.12 NG/DL (ref 0.76–1.46)
TSH SERPL DL<=0.05 MIU/L-ACNC: 1.28 UIU/ML (ref 0.36–3.74)

## 2019-06-25 PROCEDURE — 84439 ASSAY OF FREE THYROXINE: CPT

## 2019-06-25 PROCEDURE — 83036 HEMOGLOBIN GLYCOSYLATED A1C: CPT

## 2019-06-25 PROCEDURE — 84443 ASSAY THYROID STIM HORMONE: CPT

## 2019-06-25 PROCEDURE — 86003 ALLG SPEC IGE CRUDE XTRC EA: CPT

## 2019-06-25 PROCEDURE — 83001 ASSAY OF GONADOTROPIN (FSH): CPT

## 2019-06-25 PROCEDURE — 83002 ASSAY OF GONADOTROPIN (LH): CPT

## 2019-06-25 PROCEDURE — 36415 COLL VENOUS BLD VENIPUNCTURE: CPT

## 2019-06-25 PROCEDURE — 83970 ASSAY OF PARATHORMONE: CPT

## 2019-06-26 LAB
A ALTERNATA IGE QN: <0.1 KUA/I
A FUMIGATUS IGE QN: <0.1 KUA/I
ALLERGEN COMMENT: ABNORMAL
BERMUDA GRASS IGE QN: <0.1 KUA/I
BOXELDER IGE QN: <0.1 KUA/I
C HERBARUM IGE QN: <0.1 KUA/I
CAT DANDER IGE QN: <0.1 KUA/I
CMN PIGWEED IGE QN: <0.1 KUA/I
COMMON RAGWEED IGE QN: <0.1 KUA/I
COTTONWOOD IGE QN: <0.1 KUA/I
D FARINAE IGE QN: <0.1 KUA/I
D PTERONYSS IGE QN: <0.1 KUA/I
DOG DANDER IGE QN: <0.1 KUA/I
LONDON PLANE IGE QN: <0.1 KUA/I
MOUSE URINE PROT IGE QN: <0.1 KUA/I
MT JUNIPER IGE QN: <0.1 KUA/I
MUGWORT IGE QN: <0.1 KUA/I
P NOTATUM IGE QN: <0.1 KUA/I
ROACH IGE QN: <0.1 KUA/I
SHEEP SORREL IGE QN: <0.1 KUA/I
SILVER BIRCH IGE QN: <0.1 KUA/I
TIMOTHY IGE QN: <0.1 KUA/I
TOTAL IGE SMQN RAST: 230 KU/L (ref 0–113)
WALNUT IGE QN: <0.1 KUA/I
WHITE ASH IGE QN: <0.1 KUA/I
WHITE ELM IGE QN: <0.1 KUA/I
WHITE MULBERRY IGE QN: <0.1 KUA/I
WHITE OAK IGE QN: <0.1 KUA/I

## 2019-06-27 ENCOUNTER — TELEPHONE (OUTPATIENT)
Dept: FAMILY MEDICINE CLINIC | Facility: CLINIC | Age: 54
End: 2019-06-27

## 2019-06-27 ENCOUNTER — APPOINTMENT (OUTPATIENT)
Dept: LAB | Facility: CLINIC | Age: 54
End: 2019-06-27
Payer: MEDICARE

## 2019-06-27 DIAGNOSIS — E66.09 EXOGENOUS OBESITY: ICD-10-CM

## 2019-06-27 DIAGNOSIS — N20.0 URIC ACID NEPHROLITHIASIS: ICD-10-CM

## 2019-06-27 DIAGNOSIS — I10 ESSENTIAL HYPERTENSION, MALIGNANT: ICD-10-CM

## 2019-06-27 DIAGNOSIS — E88.81 DYSMETABOLIC SYNDROME X: ICD-10-CM

## 2019-06-27 DIAGNOSIS — E28.310 SYMPTOMATIC PREMATURE MENOPAUSE: ICD-10-CM

## 2019-06-27 PROCEDURE — 82088 ASSAY OF ALDOSTERONE: CPT

## 2019-07-05 LAB
ALDOST 24H UR-MRATE: <6.25 UG/24 HR (ref 0–19)
ALDOST UR-MCNC: <2.5 UG/L

## 2019-08-01 ENCOUNTER — PROCEDURE VISIT (OUTPATIENT)
Dept: UROLOGY | Facility: AMBULATORY SURGERY CENTER | Age: 54
End: 2019-08-01
Payer: MEDICARE

## 2019-08-01 VITALS
HEART RATE: 66 BPM | WEIGHT: 197 LBS | BODY MASS INDEX: 33.63 KG/M2 | HEIGHT: 64 IN | DIASTOLIC BLOOD PRESSURE: 86 MMHG | SYSTOLIC BLOOD PRESSURE: 128 MMHG

## 2019-08-01 DIAGNOSIS — R32 URINARY INCONTINENCE, UNSPECIFIED TYPE: Primary | ICD-10-CM

## 2019-08-01 LAB
SL AMB  POCT GLUCOSE, UA: NORMAL
SL AMB LEUKOCYTE ESTERASE,UA: NORMAL
SL AMB POCT BILIRUBIN,UA: NORMAL
SL AMB POCT BLOOD,UA: NORMAL
SL AMB POCT CLARITY,UA: CLEAR
SL AMB POCT COLOR,UA: YELLOW
SL AMB POCT KETONES,UA: 5
SL AMB POCT NITRITE,UA: NORMAL
SL AMB POCT PH,UA: 6
SL AMB POCT SPECIFIC GRAVITY,UA: 1
SL AMB POCT URINE PROTEIN: NORMAL
SL AMB POCT UROBILINOGEN: 0.2

## 2019-08-01 PROCEDURE — 81002 URINALYSIS NONAUTO W/O SCOPE: CPT | Performed by: UROLOGY

## 2019-08-01 PROCEDURE — 52000 CYSTOURETHROSCOPY: CPT | Performed by: UROLOGY

## 2019-08-01 PROCEDURE — 99213 OFFICE O/P EST LOW 20 MIN: CPT | Performed by: UROLOGY

## 2019-08-01 RX ORDER — CLINDAMYCIN PHOSPHATE 900 MG/50ML
900 INJECTION INTRAVENOUS ONCE
Status: CANCELLED | OUTPATIENT
Start: 2019-08-01

## 2019-08-01 NOTE — PATIENT INSTRUCTIONS
Bladder Sling Procedure   WHAT YOU NEED TO KNOW:   What do I need to know about a bladder sling procedure? A bladder sling procedure is surgery to treat urinary incontinence in women  The sling acts as a hammock to keep your urethra in place and hold it closed when your bladder is full  The urethra is the tube that goes from your bladder to the outside  A sling may be a thin strip of mesh placed under the urethra  The sling may instead be made from a piece of your tissue taken from your abdomen or thigh  The sling supports your urethra during physical activity or when you sneeze or cough to prevent leakage  How do I prepare for surgery? Your healthcare provider will talk to you about how to prepare for surgery  He may tell you not to eat or drink anything after midnight on the day of your surgery  He will tell you what medicines to take or not take on the day of your surgery  If you take aspirin or other blood thinners, you may need to stop taking them 5 to 7 days before your surgery  You may be given an antibiotic through your IV to help prevent a bacterial infection  What will happen during surgery? · You may be given general anesthesia to keep you asleep and free from pain during surgery  You may instead be given anesthesia to numb the surgery area  With this anesthesia, you may still feel pressure or pushing during surgery, but you should not feel any pain  A small incision will be made in your vagina to insert the sling  You may also have small incisions in your lower abdomen or thigh  · The sling will be placed under your urethra and around the area where your urethra and bladder meet  One end of the sling will be on each side of your urethra  The ends of the sling may be stitched to your abdominal wall  Small pins or screws may instead be placed in your pelvic bone to hold the sling in place  Your healthcare provider will check for correct placement of the sling   Any small incisions will be closed with stitches or medical glue  A bandage with antibiotic medicine may be put inside your vagina to help prevent infection  What will happen after a bladder sling? You may have vaginal bleeding or discharge for up to a week after your surgery  Use sanitary pads  Do not use tampons  You may have some pelvic discomfort after your surgery  You may find it hard to urinate or it may feel different than it did before surgery  You may urinate more slowly than you did before surgery  You may need to use a catheter to empty your bladder a few times a day until your function returns  You may have a Mcintosh catheter for a short period of time to drain your urine  You may need to avoid any activity that can strain your surgery area  This includes heavy lifting, strenuous exercise, or straining for a bowel movement  What are the risks of a bladder sling? You may bleed more than expected or get an infection  The sling may break down and you may need another procedure to fix it  You may develop long-term pain  Your bladder or other pelvic organs may be injured by tools used during the procedure  You may have an allergic response to the anesthesia medicine  You may have trouble urinating, or you may leak urine  You may develop urgency to urinate or urinate often  You also may have pain during or after having sex  You may get a blood clot in your limb  This may become life-threatening  CARE AGREEMENT:   You have the right to help plan your care  Learn about your health condition and how it may be treated  Discuss treatment options with your caregivers to decide what care you want to receive  You always have the right to refuse treatment  The above information is an  only  It is not intended as medical advice for individual conditions or treatments  Talk to your doctor, nurse or pharmacist before following any medical regimen to see if it is safe and effective for you    © 2017 [facility]  is for End User's use only and may not be sold, redistributed or otherwise used for commercial purposes  All illustrations and images included in CareNotes® are the copyrighted property of A D A M , Inc  or Will Melton

## 2019-08-01 NOTE — PROGRESS NOTES
Cystoscopy  Date/Time: 8/1/2019 1:29 PM  Performed by: Liza Bills MD  Authorized by: Liza Bills MD     Procedure details: cystoscopy          Written Consent Obtained  Indications for Procedure:  Stress incontinence    Physical Exam    Constitutional   General appearance: No acute distress, well appearing and well nourished  Pulmonary   Respiratory effort: No increased work of breathing or signs of respiratory distress  Cardiovascular   Examination of extremities for edema and/or varicosities: Normal     Abdomen   Abdomen: Non-tender, no masses  Liver and spleen: No hepatomegaly or splenomegaly  Genitourinary   External genitalia and vagina: Normal, no lesions appreciated  Urethra: Normal, no discharge  Bladder: Not distended, no tenderness  Musculoskeletal   Gait and station: Normal     Skin   Skin and subcutaneous tissue: Normal without rashes or lesions  Lymphatic   Palpation of lymph nodes in groin: No lymphadenopathy  Additional Exam: Neuro exam nonfocal           The flexible cystoscope was introduced into the urethra and advanced into the bladder  URETHRA:  Normal,  without strictures or lesions  TRIGONE & UOs:   Normal anatomy with efflux of clear urine  No mucosal lesions or subtrigonal masses  BLADDER MUCOSA:  Normal, without neoplasms or other lesions  DETRUSOR: Normal capacity, without flaccidity, without excessive compliance, without trabeculation or diverticula, without uninhibited bladder contractions on fillings  RETROFLEXED SCOPE VIEW: Normal bladder neck    The bladder was filled with approximately 300 mL of sterile water and the cystoscope was removed  Provocative coughing test was performed that showed urethral hypermobility and significant stress incontinence  Plan:  The patient had no abnormality in her bladder  She does have significant stress incontinence and a hyper mobile urethra  We will proceed with mid urethral sling placement    Risks and benefits were discussed and she was consented  We will schedule this in the near future  In addition to the cystoscopy today I spent over 15 minutes discussing treatment options for stress incontinence and consenting her for surgery

## 2019-08-05 ENCOUNTER — HOSPITAL ENCOUNTER (OUTPATIENT)
Facility: AMBULARY SURGERY CENTER | Age: 54
Setting detail: OUTPATIENT SURGERY
End: 2019-08-05
Attending: UROLOGY | Admitting: UROLOGY
Payer: MEDICARE

## 2019-08-07 ENCOUNTER — TRANSCRIBE ORDERS (OUTPATIENT)
Dept: LAB | Facility: CLINIC | Age: 54
End: 2019-08-07

## 2019-08-07 DIAGNOSIS — Z15.89 DEFICIENCY OF DNA REPAIR: Primary | ICD-10-CM

## 2019-08-28 ENCOUNTER — APPOINTMENT (OUTPATIENT)
Dept: LAB | Facility: CLINIC | Age: 54
End: 2019-08-28
Payer: MEDICARE

## 2019-08-28 DIAGNOSIS — Z15.89 DEFICIENCY OF DNA REPAIR: ICD-10-CM

## 2019-09-06 ENCOUNTER — APPOINTMENT (OUTPATIENT)
Dept: LAB | Facility: CLINIC | Age: 54
End: 2019-09-06
Payer: MEDICARE

## 2019-09-06 DIAGNOSIS — R32 URINARY INCONTINENCE, UNSPECIFIED TYPE: ICD-10-CM

## 2019-09-06 PROCEDURE — 87086 URINE CULTURE/COLONY COUNT: CPT

## 2019-09-07 LAB — BACTERIA UR CULT: NORMAL

## 2019-09-09 VITALS — WEIGHT: 193 LBS | BODY MASS INDEX: 32.95 KG/M2 | HEIGHT: 64 IN

## 2019-09-09 RX ORDER — UBIDECARENONE 75 MG
CAPSULE ORAL DAILY
COMMUNITY

## 2019-09-09 RX ORDER — IBUPROFEN 400 MG/1
TABLET ORAL EVERY 6 HOURS PRN
COMMUNITY

## 2019-09-09 RX ORDER — MELATONIN
1000 DAILY
COMMUNITY

## 2019-09-09 RX ORDER — ACETAMINOPHEN 325 MG/1
650 TABLET ORAL EVERY 6 HOURS PRN
COMMUNITY

## 2019-09-09 NOTE — PRE-PROCEDURE INSTRUCTIONS
Pre-Surgery Instructions:   Medication Instructions    acetaminophen (TYLENOL) 325 mg tablet Instructed patient per Anesthesia Guidelines   cholecalciferol (VITAMIN D3) 1,000 units tablet Patient was instructed by Physician and understands   cyanocobalamin (VITAMIN B-12) 100 mcg tablet Patient was instructed by Physician and understands   ibuprofen (MOTRIN) 400 mg tablet Patient was instructed by Physician and understands  Pre op and bathing instructions reviewed  Pt has hibiclens

## 2019-09-12 ENCOUNTER — APPOINTMENT (OUTPATIENT)
Dept: LAB | Facility: CLINIC | Age: 54
End: 2019-09-12
Payer: MEDICARE

## 2019-09-12 ENCOUNTER — TRANSCRIBE ORDERS (OUTPATIENT)
Dept: ADMINISTRATIVE | Facility: HOSPITAL | Age: 54
End: 2019-09-12

## 2019-09-12 DIAGNOSIS — L95.8 HISTIOCYTIC VASCULITIS OF SKIN: Primary | ICD-10-CM

## 2019-09-12 DIAGNOSIS — L95.8 HISTIOCYTIC VASCULITIS OF SKIN: ICD-10-CM

## 2019-09-12 LAB
BASOPHILS # BLD AUTO: 0.06 THOUSANDS/ΜL (ref 0–0.1)
BASOPHILS NFR BLD AUTO: 1 % (ref 0–1)
EOSINOPHIL # BLD AUTO: 0.25 THOUSAND/ΜL (ref 0–0.61)
EOSINOPHIL NFR BLD AUTO: 2 % (ref 0–6)
ERYTHROCYTE [DISTWIDTH] IN BLOOD BY AUTOMATED COUNT: 14.3 % (ref 11.6–15.1)
ERYTHROCYTE [SEDIMENTATION RATE] IN BLOOD: 6 MM/HOUR (ref 0–20)
HCT VFR BLD AUTO: 49.2 % (ref 34.8–46.1)
HGB BLD-MCNC: 15.6 G/DL (ref 11.5–15.4)
IMM GRANULOCYTES # BLD AUTO: 0.07 THOUSAND/UL (ref 0–0.2)
IMM GRANULOCYTES NFR BLD AUTO: 1 % (ref 0–2)
LYMPHOCYTES # BLD AUTO: 3.58 THOUSANDS/ΜL (ref 0.6–4.47)
LYMPHOCYTES NFR BLD AUTO: 31 % (ref 14–44)
MCH RBC QN AUTO: 28.8 PG (ref 26.8–34.3)
MCHC RBC AUTO-ENTMCNC: 31.7 G/DL (ref 31.4–37.4)
MCV RBC AUTO: 91 FL (ref 82–98)
MONOCYTES # BLD AUTO: 0.85 THOUSAND/ΜL (ref 0.17–1.22)
MONOCYTES NFR BLD AUTO: 7 % (ref 4–12)
NEUTROPHILS # BLD AUTO: 6.76 THOUSANDS/ΜL (ref 1.85–7.62)
NEUTS SEG NFR BLD AUTO: 58 % (ref 43–75)
NRBC BLD AUTO-RTO: 0 /100 WBCS
PLATELET # BLD AUTO: 291 THOUSANDS/UL (ref 149–390)
PMV BLD AUTO: 10.4 FL (ref 8.9–12.7)
RBC # BLD AUTO: 5.42 MILLION/UL (ref 3.81–5.12)
WBC # BLD AUTO: 11.57 THOUSAND/UL (ref 4.31–10.16)

## 2019-09-12 PROCEDURE — 86255 FLUORESCENT ANTIBODY SCREEN: CPT

## 2019-09-12 PROCEDURE — 85652 RBC SED RATE AUTOMATED: CPT

## 2019-09-12 PROCEDURE — 85025 COMPLETE CBC W/AUTO DIFF WBC: CPT

## 2019-09-12 PROCEDURE — 36415 COLL VENOUS BLD VENIPUNCTURE: CPT

## 2019-09-16 LAB
C-ANCA TITR SER IF: NORMAL TITER
MYELOPEROXIDASE AB SER IA-ACNC: <9 U/ML (ref 0–9)
P-ANCA ATYPICAL TITR SER IF: NORMAL TITER
P-ANCA TITR SER IF: NORMAL TITER
PROTEINASE3 AB SER IA-ACNC: <3.5 U/ML (ref 0–3.5)

## 2019-09-17 ENCOUNTER — TELEPHONE (OUTPATIENT)
Dept: FAMILY MEDICINE CLINIC | Facility: CLINIC | Age: 54
End: 2019-09-17

## 2019-09-17 DIAGNOSIS — R19.5 POSITIVE COLORECTAL CANCER SCREENING USING COLOGUARD TEST: Primary | ICD-10-CM

## 2019-09-17 NOTE — TELEPHONE ENCOUNTER
T/c from Cologuard asking if we received the results on this patient faxed yesterday  Review of chart: results not found  If we do not receive results from Central Fax we are to call [phone] and ask for Provider Support for results to be resent  Patient's verbal Cologuard results are positive

## 2019-09-24 RX ORDER — SODIUM CHLORIDE, SODIUM LACTATE, POTASSIUM CHLORIDE, CALCIUM CHLORIDE 600; 310; 30; 20 MG/100ML; MG/100ML; MG/100ML; MG/100ML
125 INJECTION, SOLUTION INTRAVENOUS CONTINUOUS
Status: CANCELLED | OUTPATIENT
Start: 2019-09-24

## 2019-09-24 RX ORDER — LIDOCAINE HYDROCHLORIDE 10 MG/ML
0.5 INJECTION, SOLUTION EPIDURAL; INFILTRATION; INTRACAUDAL; PERINEURAL ONCE AS NEEDED
Status: CANCELLED | OUTPATIENT
Start: 2019-09-24

## 2019-09-25 ENCOUNTER — OFFICE VISIT (OUTPATIENT)
Dept: FAMILY MEDICINE CLINIC | Facility: CLINIC | Age: 54
End: 2019-09-25
Payer: MEDICARE

## 2019-09-25 ENCOUNTER — TELEPHONE (OUTPATIENT)
Dept: UROLOGY | Facility: AMBULATORY SURGERY CENTER | Age: 54
End: 2019-09-25

## 2019-09-25 VITALS
OXYGEN SATURATION: 97 % | SYSTOLIC BLOOD PRESSURE: 142 MMHG | BODY MASS INDEX: 33.12 KG/M2 | DIASTOLIC BLOOD PRESSURE: 82 MMHG | WEIGHT: 194 LBS | HEART RATE: 102 BPM | TEMPERATURE: 100.1 F | HEIGHT: 64 IN

## 2019-09-25 DIAGNOSIS — Z72.0 TOBACCO USE: ICD-10-CM

## 2019-09-25 DIAGNOSIS — Z85.038 HISTORY OF COLON CANCER: ICD-10-CM

## 2019-09-25 DIAGNOSIS — R31.9 HEMATURIA, UNSPECIFIED TYPE: ICD-10-CM

## 2019-09-25 DIAGNOSIS — K92.1 BLOOD IN STOOL: ICD-10-CM

## 2019-09-25 DIAGNOSIS — R97.0 ELEVATED CARCINOEMBRYONIC ANTIGEN (CEA): ICD-10-CM

## 2019-09-25 DIAGNOSIS — F34.1 DYSTHYMIA: ICD-10-CM

## 2019-09-25 DIAGNOSIS — M25.50 ARTHRALGIA, UNSPECIFIED JOINT: Primary | ICD-10-CM

## 2019-09-25 PROCEDURE — 99214 OFFICE O/P EST MOD 30 MIN: CPT | Performed by: FAMILY MEDICINE

## 2019-09-25 RX ORDER — BUPROPION HYDROCHLORIDE 300 MG/1
TABLET ORAL
Qty: 90 TABLET | Refills: 1 | Status: SHIPPED | OUTPATIENT
Start: 2019-09-25 | End: 2019-10-10 | Stop reason: SDUPTHER

## 2019-09-25 RX ORDER — VARENICLINE TARTRATE 1 MG/1
TABLET, FILM COATED ORAL
Qty: 180 TABLET | Refills: 1 | Status: SHIPPED | OUTPATIENT
Start: 2019-09-25

## 2019-09-25 NOTE — PROGRESS NOTES
Tobacco Cessation Counseling: The patient is sincerely urged to quit consumption of tobacco  She is ready to quit tobacco  Medication options and side effects of medication discussed  Standard Visit   Assessment/Plan:     Visit Diagnosis:  Diagnoses and all orders for this visit:    Arthralgia, unspecified joint  -     Lyme Antibody Profile with reflex to WB; Future    Elevated carcinoembryonic antigen (CEA)  -     Lyme Antibody Profile with reflex to WB; Future    Blood in stool  -     CEA; Future    Hematuria, unspecified type    Tobacco use  -     varenicline (CHANTIX) 1 mg tablet; One pill twice a day    History of colon cancer    Dysthymia  -     buPROPion (WELLBUTRIN XL) 300 mg 24 hr tablet; Take 1 pill daily in the morning    Follow-up appointment 2 weeks  30 minutes  Start the Chantix and Wellbutrin  Our goal is you must stop smoking  Get the lab work the Lyme titer and a CEA        Subjective:    Patient ID: Ozzy Avila is a 47 y o  female       Patient is here with the following concerns:    Here with a fever  She was to have a surgery this morning a bladder lift and a ablation  But because of the fever it was canceled an appointment was made with us  She is a current smoker  She started smoking at age 13  She smokes a pack a day  She has a chronic cough  She has tried to cough on numerous occasions but she is unsuccessful    Recently had a cologard test which was positive  She had a positive colonoscopy with colon cancer in 1994 however they approved her Cologard any way  She had a colon resection in 1994 of the descending colon and she had colon surveillance and lab work done every 6 months until about 5 years ago  So she has an appointment with a surgeon through [facility] on October 2nd        The following portions of the patient's history were reviewed and updated as appropriate: allergies, current medications, past family history, past medical history, past social history, past surgical history and problem list     Review of Systems   Constitutional: Positive for chills, fatigue and fever (Slight elevation of fever this morning)  HENT: Positive for nosebleeds and postnasal drip  Respiratory: Positive for cough  Gastrointestinal: Positive for blood in stool           /82   Pulse 102   Temp 100 1 °F (37 8 °C)   Ht 5' 4" (1 626 m)   Wt 88 kg (194 lb)   LMP  (LMP Unknown)   SpO2 97%   BMI 33 30 kg/m²   Social History     Socioeconomic History    Marital status: /Civil Union     Spouse name: Not on file    Number of children: Not on file    Years of education: Not on file    Highest education level: Not on file   Occupational History    Not on file   Social Needs    Financial resource strain: Not on file    Food insecurity:     Worry: Not on file     Inability: Not on file    Transportation needs:     Medical: Not on file     Non-medical: Not on file   Tobacco Use    Smoking status: Current Every Day Smoker     Packs/day: 1 00     Types: Cigarettes    Smokeless tobacco: Never Used   Substance and Sexual Activity    Alcohol use: No    Drug use: No    Sexual activity: Yes     Partners: Male     Birth control/protection: Post-menopausal   Lifestyle    Physical activity:     Days per week: Not on file     Minutes per session: Not on file    Stress: Not on file   Relationships    Social connections:     Talks on phone: Not on file     Gets together: Not on file     Attends Roman [employer] service: Not on file     Active member of club or organization: Not on file     Attends meetings of clubs or organizations: Not on file     Relationship status: Not on file    Intimate partner violence:     Fear of current or ex partner: Not on file     Emotionally abused: Not on file     Physically abused: Not on file     Forced sexual activity: Not on file   Other Topics Concern    Not on file   Social History Narrative    Not on file     Past Medical History:   Diagnosis Date    Anxiety     Arthritis     Bleeding disorder ([facility] )     Breast CA ([place] [address] ) 2009    left     Breast cancer ([place] [address] ) [id number]    right    Chicken pox     Chronic pain disorder     Colon cancer ([place] [address] )     Depression     Endometriosis     History of blood transfusion     Migraines     [facility] 2018    TBI (traumatic brain injury) ([facility] )     no weakness    Von Willebrand disease ([place] [address] )     [id number] Acquired Danette Ready now resolved as per hematologist     Family History   Problem Relation Age of Onset    Heart disease Father     Breast cancer Neg Hx      Past Surgical History:   Procedure Laterality Date    BREAST LUMPECTOMY Bilateral  and 2009    BREAST SURGERY       SECTION      COLECTOMY      COLONOSCOPY      DILATION AND CURETTAGE OF UTERUS      EYE SURGERY Right     HYSTERECTOMY  2014    JOINT REPLACEMENT Bilateral ,,    THR    OTHER SURGICAL HISTORY      minimally invasive neuro surgery    SINUS SURGERY      TOTAL HIP ARTHROPLASTY         Current Outpatient Medications:     acetaminophen (TYLENOL) 325 mg tablet, Take 650 mg by mouth every 6 (six) hours as needed for mild pain, Disp: , Rfl:     buPROPion (WELLBUTRIN XL) 300 mg 24 hr tablet, Take 1 pill daily in the morning, Disp: 90 tablet, Rfl: 1    cholecalciferol (VITAMIN D3) 1,000 units tablet, Take 1,000 Units by mouth daily, Disp: , Rfl:     cyanocobalamin (VITAMIN B-12) 100 mcg tablet, Take by mouth daily, Disp: , Rfl:     ibuprofen (MOTRIN) 400 mg tablet, Take by mouth every 6 (six) hours as needed for mild pain, Disp: , Rfl:     varenicline (CHANTIX) 1 mg tablet, One pill twice a day, Disp: 180 tablet, Rfl: 1  No current facility-administered medications for this visit  Objective:     Physical Exam   Constitutional: She appears well-developed and well-nourished  HENT:   Head: Normocephalic and atraumatic  Eyes: Pupils are equal, round, and reactive to light  Neck: Normal range of motion  Neck supple  Cardiovascular: Normal rate  Pulmonary/Chest: Effort normal and breath sounds normal    Deep cough all through the exam   Abdominal: Soft  Musculoskeletal: Normal range of motion  Nursing note and vitals reviewed        Social History     Socioeconomic History    Marital status: /Civil Union     Spouse name: Not on file    Number of children: Not on file    Years of education: Not on file    Highest education level: Not on file   Occupational History    Not on file   Social Needs    Financial resource strain: Not on file    Food insecurity:     Worry: Not on file     Inability: Not on file    Transportation needs:     Medical: Not on file     Non-medical: Not on file   Tobacco Use    Smoking status: Current Every Day Smoker     Packs/day: 1 00     Types: Cigarettes    Smokeless tobacco: Never Used   Substance and Sexual Activity    Alcohol use: No    Drug use: No    Sexual activity: Yes     Partners: Male     Birth control/protection: Post-menopausal   Lifestyle    Physical activity:     Days per week: Not on file     Minutes per session: Not on file    Stress: Not on file   Relationships    Social connections:     Talks on phone: Not on file     Gets together: Not on file     Attends Scientologist service: Not on file     Active member of club or organization: Not on file     Attends meetings of clubs or organizations: Not on file     Relationship status: Not on file    Intimate partner violence:     Fear of current or ex partner: Not on file     Emotionally abused: Not on file     Physically abused: Not on file     Forced sexual activity: Not on file   Other Topics Concern    Not on file   Social History Narrative    Not on file     Past Medical History:   Diagnosis Date    Anxiety     Arthritis     [facility] ([facility] )     Breast CA ([place] [address] ) 2009    left     Breast cancer ([place] [address] ) [id number]    right    Chicken pox     Chronic pain disorder     Colon cancer ([place] [address] )     Depression     Endometriosis     History of blood transfusion 2009    Migraines     Seizures ([place] [address] ) 05/2018    TBI (traumatic brain injury) ([place] [address] )     no weakness    Von Willebrand disease ([place] [address] )     [id number] Acquired Sil Lu now resolved as per hematologist       Current Outpatient Medications:     acetaminophen (TYLENOL) 325 mg tablet, Take 650 mg by mouth every 6 (six) hours as needed for mild pain, Disp: , Rfl:     buPROPion (WELLBUTRIN XL) 300 mg 24 hr tablet, Take 1 pill daily in the morning, Disp: 90 tablet, Rfl: 1    cholecalciferol (VITAMIN D3) 1,000 units tablet, Take 1,000 Units by mouth daily, Disp: , Rfl:     cyanocobalamin (VITAMIN B-12) 100 mcg tablet, Take by mouth daily, Disp: , Rfl:     ibuprofen (MOTRIN) 400 mg tablet, Take by mouth every 6 (six) hours as needed for mild pain, Disp: , Rfl:     varenicline (CHANTIX) 1 mg tablet, One pill twice a day, Disp: 180 tablet, Rfl: 1  No current facility-administered medications for this visit  Family History   Problem Relation Age of Onset    Heart disease Father     Breast cancer Neg Hx      There are no Patient Instructions on file for this visit          HIGINIO Urban

## 2019-09-25 NOTE — TELEPHONE ENCOUNTER
Patient called in and stated she had a fever last night after her arrival time call  She called the office and was transferred by the after hours  to a person at the hospital  She told that person (she could not remember their name) that she was not feeling well and would have to cancel her surgery for today

## 2019-09-26 ENCOUNTER — PREP FOR PROCEDURE (OUTPATIENT)
Dept: UROLOGY | Facility: HOSPITAL | Age: 54
End: 2019-09-26

## 2019-09-26 DIAGNOSIS — N39.3 STRESS INCONTINENCE: Primary | ICD-10-CM

## 2019-09-26 RX ORDER — CLINDAMYCIN PHOSPHATE 900 MG/50ML
900 INJECTION INTRAVENOUS ONCE
Status: CANCELLED | OUTPATIENT
Start: 2019-09-26

## 2019-09-27 ENCOUNTER — TELEPHONE (OUTPATIENT)
Dept: FAMILY MEDICINE CLINIC | Facility: CLINIC | Age: 54
End: 2019-09-27

## 2019-09-27 NOTE — TELEPHONE ENCOUNTER
She can not get the medications  It cost almost $100 together and she has tried every method in the past to get them

## 2019-10-01 NOTE — TELEPHONE ENCOUNTER
According to the most recent CDC report:    1 pack of cigarettes is average $6 28 a day or $188 a month  So she would be healthier AND she would be SAVING money by purchasing the medications

## 2019-10-03 ENCOUNTER — APPOINTMENT (OUTPATIENT)
Dept: LAB | Facility: CLINIC | Age: 54
End: 2019-10-03
Payer: MEDICARE

## 2019-10-03 DIAGNOSIS — K92.1 BLOOD IN STOOL: ICD-10-CM

## 2019-10-03 DIAGNOSIS — R97.0 ELEVATED CARCINOEMBRYONIC ANTIGEN (CEA): ICD-10-CM

## 2019-10-03 DIAGNOSIS — M25.50 ARTHRALGIA, UNSPECIFIED JOINT: ICD-10-CM

## 2019-10-03 LAB — CEA SERPL-MCNC: 1.6 NG/ML (ref 0–3)

## 2019-10-03 PROCEDURE — 82378 CARCINOEMBRYONIC ANTIGEN: CPT

## 2019-10-03 PROCEDURE — 86618 LYME DISEASE ANTIBODY: CPT

## 2019-10-03 PROCEDURE — 36415 COLL VENOUS BLD VENIPUNCTURE: CPT

## 2019-10-04 LAB — B BURGDOR IGG+IGM SER-ACNC: <0.91 ISR (ref 0–0.9)

## 2019-10-10 ENCOUNTER — OFFICE VISIT (OUTPATIENT)
Dept: FAMILY MEDICINE CLINIC | Facility: CLINIC | Age: 54
End: 2019-10-10
Payer: MEDICARE

## 2019-10-10 VITALS
DIASTOLIC BLOOD PRESSURE: 74 MMHG | TEMPERATURE: 99 F | BODY MASS INDEX: 32.95 KG/M2 | HEART RATE: 89 BPM | SYSTOLIC BLOOD PRESSURE: 128 MMHG | HEIGHT: 64 IN | WEIGHT: 193 LBS | OXYGEN SATURATION: 98 %

## 2019-10-10 DIAGNOSIS — F34.1 DYSTHYMIA: ICD-10-CM

## 2019-10-10 DIAGNOSIS — Z85.038 HISTORY OF COLON CANCER: Primary | ICD-10-CM

## 2019-10-10 DIAGNOSIS — Z72.0 TOBACCO USE: ICD-10-CM

## 2019-10-10 PROCEDURE — 99214 OFFICE O/P EST MOD 30 MIN: CPT | Performed by: FAMILY MEDICINE

## 2019-10-10 RX ORDER — BUPROPION HYDROCHLORIDE 300 MG/1
TABLET ORAL
Qty: 30 TABLET | Refills: 3 | Status: SHIPPED | OUTPATIENT
Start: 2019-10-10

## 2019-10-10 NOTE — PROGRESS NOTES
Tobacco Cessation Counseling: Tobacco cessation counseling was provided  The patient is sincerely urged to quit consumption of tobacco  She is ready to quit tobacco  Medication options discussed  Standard Visit   Assessment/Plan:     Visit Diagnosis:  Diagnoses and all orders for this visit:    History of colon cancer    Dysthymia  -     buPROPion (WELLBUTRIN XL) 300 mg 24 hr tablet; Take 1 pill daily in the morning    Tobacco use    Try and use the good Rx for the Wellbutrin  I want you to  the Nicoderm gone  And try to actually stop the smoking the cigarettes  Throw way the ashtrays  CEA was reviewed with the patient 1 6  She does have a history of colon cancer from [address] [place] [address]  She is scheduled to have a colonoscopy done next week  The bladder surgery is rescheduled for November 5, 2019 and she will be a nonsmoker by then      Subjective:    Patient ID: Deshaun Reveles is a 47 y o  female  Patient is here with the following concerns:    Here for follow-up she was unable to get the Chantix or the Wellbutrin due to [place]  The Co pays were very high  She is a 1 pack-per-day smoker  With the last visit she was due to have a surgery bladder ablation and a bladder lift  But the day of her surgery she was found to have a fever  She is due to have her colonoscopy done next week      The following portions of the patient's history were reviewed and updated as appropriate: allergies, current medications, past family history, past medical history, past social history, past surgical history and problem list     Review of Systems   Constitutional: Negative for activity change and fever  HENT: Negative for nosebleeds and trouble swallowing  Eyes: Negative  Respiratory: Negative  Smokes 1 pack per day   Cardiovascular: Negative for palpitations  Gastrointestinal: Negative for abdominal pain, blood in stool and vomiting  Endocrine: Negative for cold intolerance     Genitourinary: Negative  Musculoskeletal: Negative for neck stiffness  Skin: Negative for pallor  Allergic/Immunologic: Negative for immunocompromised state  Neurological: Negative for seizures and facial asymmetry  Hematological: Negative for adenopathy  Psychiatric/Behavioral: Negative for agitation and suicidal ideas           /74   Pulse 89   Temp 99 °F (37 2 °C)   Ht 5' 4" (1 626 m)   Wt 87 5 kg (193 lb)   LMP  (LMP Unknown)   SpO2 98%   BMI 33 13 kg/m²   Social History     Socioeconomic History    Marital status: /Civil Union     Spouse name: Not on file    Number of children: Not on file    Years of education: Not on file    Highest education level: Not on file   Occupational History    Not on file   Social Needs    Financial resource strain: Not on file    Food insecurity:     Worry: Not on file     Inability: Not on file    Transportation needs:     Medical: Not on file     Non-medical: Not on file   Tobacco Use    Smoking status: Current Every Day Smoker     Packs/day: 1 00     Types: Cigarettes    Smokeless tobacco: Never Used   Substance and Sexual Activity    Alcohol use: No    Drug use: No    Sexual activity: Yes     Partners: Male     Birth control/protection: Post-menopausal   Lifestyle    Physical activity:     Days per week: Not on file     Minutes per session: Not on file    Stress: Not on file   Relationships    Social connections:     Talks on phone: Not on file     Gets together: Not on file     Attends Alevism service: Not on file     Active member of club or organization: Not on file     Attends meetings of clubs or organizations: Not on file     Relationship status: Not on file    Intimate partner violence:     Fear of current or ex partner: Not on file     Emotionally abused: Not on file     Physically abused: Not on file     Forced sexual activity: Not on file   Other Topics Concern    Not on file   Social History Narrative    Not on file     Past Medical History:   Diagnosis Date    Anxiety     Arthritis     [facility] disorder ([facility] )     [facility] ) 2009    left     Breast cancer ([place] [address] ) [id number]    right    Chicken pox     Chronic pain disorder     Colon cancer ([facility] )     Depression     Endometriosis     History of blood transfusion     Migraines     Seizures [facility] ) 2018    TBI (traumatic brain injury) ([facility] )     no weakness    Von Willebrand disease ([place] [address] )     [id number] Acquired Charolotte Anitha now resolved as per hematologist     Family History   Problem Relation Age of Onset    Heart disease Father     Breast cancer Neg Hx      Past Surgical History:   Procedure Laterality Date    BREAST LUMPECTOMY Bilateral  and 2009    BREAST SURGERY       SECTION      COLECTOMY      COLONOSCOPY      DILATION AND CURETTAGE OF UTERUS      EYE SURGERY Right     HYSTERECTOMY  2014    JOINT REPLACEMENT Bilateral ,,    THR    OTHER SURGICAL HISTORY      minimally invasive neuro surgery    SINUS SURGERY      TOTAL HIP ARTHROPLASTY         Current Outpatient Medications:     acetaminophen (TYLENOL) 325 mg tablet, Take 650 mg by mouth every 6 (six) hours as needed for mild pain, Disp: , Rfl:     cholecalciferol (VITAMIN D3) 1,000 units tablet, Take 1,000 Units by mouth daily, Disp: , Rfl:     cyanocobalamin (VITAMIN B-12) 100 mcg tablet, Take by mouth daily, Disp: , Rfl:     ibuprofen (MOTRIN) 400 mg tablet, Take by mouth every 6 (six) hours as needed for mild pain, Disp: , Rfl:     buPROPion (WELLBUTRIN XL) 300 mg 24 hr tablet, Take 1 pill daily in the morning, Disp: 30 tablet, Rfl: 3    varenicline (CHANTIX) 1 mg tablet, One pill twice a day (Patient not taking: Reported on 10/10/2019), Disp: 180 tablet, Rfl: 1      Objective:     Physical Exam   Constitutional: She is oriented to person, place, and time  She appears well-developed and well-nourished  HENT:   Head: Normocephalic and atraumatic  Eyes: Pupils are equal, round, and reactive to light  Neck: Normal range of motion  Neck supple  Cardiovascular: Normal rate  Pulmonary/Chest: Effort normal and breath sounds normal    Abdominal: Soft  Musculoskeletal: Normal range of motion  Neurological: She is alert and oriented to person, place, and time  Skin: Skin is warm and dry  Psychiatric: She has a normal mood and affect  Nursing note and vitals reviewed        Social History     Socioeconomic History    Marital status: /Civil Union     Spouse name: Not on file    Number of children: Not on file    Years of education: Not on file    Highest education level: Not on file   Occupational History    Not on file   Social Needs    Financial resource strain: Not on file    Food insecurity:     Worry: Not on file     Inability: Not on file    Transportation needs:     Medical: Not on file     Non-medical: Not on file   Tobacco Use    Smoking status: Current Every Day Smoker     Packs/day: 1 00     Types: Cigarettes    Smokeless tobacco: Never Used   Substance and Sexual Activity    Alcohol use: No    Drug use: No    Sexual activity: Yes     Partners: Male     Birth control/protection: Post-menopausal   Lifestyle    Physical activity:     Days per week: Not on file     Minutes per session: Not on file    Stress: Not on file   Relationships    Social connections:     Talks on phone: Not on file     Gets together: Not on file     Attends Zoroastrian service: Not on file     Active member of club or organization: Not on file     Attends meetings of clubs or organizations: Not on file     Relationship status: Not on file    Intimate partner violence:     Fear of current or ex partner: Not on file     Emotionally abused: Not on file     Physically abused: Not on file     Forced sexual activity: Not on file   Other Topics Concern    Not on file   Social History Narrative    Not on file     Past Medical History:   Diagnosis Date  Anxiety     Arthritis     Bleeding disorder ([place] [address] )     Breast CA ([place] [address] ) 2009    left     Breast cancer ([place] [address] ) 8310,1278    right    Chicken pox     Chronic pain disorder     Colon cancer ([place] [address] )     Depression     Endometriosis     History of blood transfusion 2009    Migraines     Seizures ([place] [address] ) 05/2018    TBI (traumatic brain injury) ([place] [address] )     no weakness    Von Willebrand disease ([place] [address] )     [phone] Acquired Sarah Lesch now resolved as per hematologist       Current Outpatient Medications:     acetaminophen (TYLENOL) 325 mg tablet, Take 650 mg by mouth every 6 (six) hours as needed for mild pain, Disp: , Rfl:     cholecalciferol (VITAMIN D3) 1,000 units tablet, Take 1,000 Units by mouth daily, Disp: , Rfl:     cyanocobalamin (VITAMIN B-12) 100 mcg tablet, Take by mouth daily, Disp: , Rfl:     ibuprofen (MOTRIN) 400 mg tablet, Take by mouth every 6 (six) hours as needed for mild pain, Disp: , Rfl:     buPROPion (WELLBUTRIN XL) 300 mg 24 hr tablet, Take 1 pill daily in the morning, Disp: 30 tablet, Rfl: 3    varenicline (CHANTIX) 1 mg tablet, One pill twice a day (Patient not taking: Reported on 10/10/2019), Disp: 180 tablet, Rfl: 1  Family History   Problem Relation Age of Onset    Heart disease Father     Breast cancer Neg Hx        There are no Patient Instructions on file for this visit          HIGINIO Carlos

## 2019-10-10 NOTE — H&P (VIEW-ONLY)
Tobacco Cessation Counseling: Tobacco cessation counseling was provided  The patient is sincerely urged to quit consumption of tobacco  She is ready to quit tobacco  Medication options discussed  Standard Visit   Assessment/Plan:     Visit Diagnosis:  Diagnoses and all orders for this visit:    History of colon cancer    Dysthymia  -     buPROPion (WELLBUTRIN XL) 300 mg 24 hr tablet; Take 1 pill daily in the morning    Tobacco use    Try and use the good Rx for the Wellbutrin  I want you to  the Nicoderm gone  And try to actually stop the smoking the cigarettes  Throw way the ashtrays  CEA was reviewed with the patient 1 6  She does have a history of colon cancer from [address] [place] [address]  She is scheduled to have a colonoscopy done next week  The bladder surgery is rescheduled for November 5, 2019 and she will be a nonsmoker by then      Subjective:    Patient ID: Demetrio Lizama is a 47 y o  female  Patient is here with the following concerns:    Here for follow-up she was unable to get the Chantix or the Wellbutrin due to [place]  The Co pays were very high  She is a 1 pack-per-day smoker  With the last visit she was due to have a surgery bladder ablation and a bladder lift  But the day of her surgery she was found to have a fever  She is due to have her colonoscopy done next week      The following portions of the patient's history were reviewed and updated as appropriate: allergies, current medications, past family history, past medical history, past social history, past surgical history and problem list     Review of Systems   Constitutional: Negative for activity change and fever  HENT: Negative for nosebleeds and trouble swallowing  Eyes: Negative  Respiratory: Negative  Smokes 1 pack per day   Cardiovascular: Negative for palpitations  Gastrointestinal: Negative for abdominal pain, blood in stool and vomiting  Endocrine: Negative for cold intolerance     Genitourinary: Negative  Musculoskeletal: Negative for neck stiffness  Skin: Negative for pallor  Allergic/Immunologic: Negative for immunocompromised state  Neurological: Negative for seizures and facial asymmetry  Hematological: Negative for adenopathy  Psychiatric/Behavioral: Negative for agitation and suicidal ideas           /74   Pulse 89   Temp 99 °F (37 2 °C)   Ht 5' 4" (1 626 m)   Wt 87 5 kg (193 lb)   LMP  (LMP Unknown)   SpO2 98%   BMI 33 13 kg/m²   Social History     Socioeconomic History    Marital status: /Civil Union     Spouse name: Not on file    Number of children: Not on file    Years of education: Not on file    Highest education level: Not on file   Occupational History    Not on file   Social Needs    Financial resource strain: Not on file    Food insecurity:     Worry: Not on file     Inability: Not on file    Transportation needs:     Medical: Not on file     Non-medical: Not on file   Tobacco Use    Smoking status: Current Every Day Smoker     Packs/day: 1 00     Types: Cigarettes    Smokeless tobacco: Never Used   Substance and Sexual Activity    Alcohol use: No    Drug use: No    Sexual activity: Yes     Partners: Male     Birth control/protection: Post-menopausal   Lifestyle    Physical activity:     Days per week: Not on file     Minutes per session: Not on file    Stress: Not on file   Relationships    Social connections:     Talks on phone: Not on file     Gets together: Not on file     Attends Druze service: Not on file     Active member of club or organization: Not on file     Attends meetings of clubs or organizations: Not on file     Relationship status: Not on file    Intimate partner violence:     Fear of current or ex partner: Not on file     Emotionally abused: Not on file     Physically abused: Not on file     Forced sexual activity: Not on file   Other Topics Concern    Not on file   Social History Narrative    Not on file     Past Medical History:   Diagnosis Date    Anxiety     Arthritis     [facility]     Breast CA ([place] [address] ) 2009    left     Breast cancer ([place] [address] ) 4941,8662    right    Chicken pox     Chronic pain disorder     Colon cancer ([place] [address] )     Depression     Endometriosis     History of blood transfusion     Migraines     [facility] 2018    TBI (traumatic brain injury) ([place] [address] )     no weakness    Von Willebrand disease ([place] [address] )     [id number] Acquired Henrene Cobia now resolved as per hematologist     Family History   Problem Relation Age of Onset    Heart disease Father     Breast cancer Neg Hx      Past Surgical History:   Procedure Laterality Date    BREAST LUMPECTOMY Bilateral  and 2009    BREAST SURGERY       SECTION      COLECTOMY      COLONOSCOPY      DILATION AND CURETTAGE OF UTERUS      EYE SURGERY Right     HYSTERECTOMY  2014    JOINT REPLACEMENT Bilateral ,,    THR    OTHER SURGICAL HISTORY      minimally invasive neuro surgery    SINUS SURGERY      TOTAL HIP ARTHROPLASTY         Current Outpatient Medications:     acetaminophen (TYLENOL) 325 mg tablet, Take 650 mg by mouth every 6 (six) hours as needed for mild pain, Disp: , Rfl:     cholecalciferol (VITAMIN D3) 1,000 units tablet, Take 1,000 Units by mouth daily, Disp: , Rfl:     cyanocobalamin (VITAMIN B-12) 100 mcg tablet, Take by mouth daily, Disp: , Rfl:     ibuprofen (MOTRIN) 400 mg tablet, Take by mouth every 6 (six) hours as needed for mild pain, Disp: , Rfl:     buPROPion (WELLBUTRIN XL) 300 mg 24 hr tablet, Take 1 pill daily in the morning, Disp: 30 tablet, Rfl: 3    varenicline (CHANTIX) 1 mg tablet, One pill twice a day (Patient not taking: Reported on 10/10/2019), Disp: 180 tablet, Rfl: 1      Objective:     Physical Exam   Constitutional: She is oriented to person, place, and time  She appears well-developed and well-nourished  HENT:   Head: Normocephalic and atraumatic  Eyes: Pupils are equal, round, and reactive to light  Neck: Normal range of motion  Neck supple  Cardiovascular: Normal rate  Pulmonary/Chest: Effort normal and breath sounds normal    Abdominal: Soft  Musculoskeletal: Normal range of motion  Neurological: She is alert and oriented to person, place, and time  Skin: Skin is warm and dry  Psychiatric: She has a normal mood and affect  Nursing note and vitals reviewed        Social History     Socioeconomic History    Marital status: /Civil Union     Spouse name: Not on file    Number of children: Not on file    Years of education: Not on file    Highest education level: Not on file   Occupational History    Not on file   Social Needs    Financial resource strain: Not on file    Food insecurity:     Worry: Not on file     Inability: Not on file    Transportation needs:     Medical: Not on file     Non-medical: Not on file   Tobacco Use    Smoking status: Current Every Day Smoker     Packs/day: 1 00     Types: Cigarettes    Smokeless tobacco: Never Used   Substance and Sexual Activity    Alcohol use: No    Drug use: No    Sexual activity: Yes     Partners: Male     Birth control/protection: Post-menopausal   Lifestyle    Physical activity:     Days per week: Not on file     Minutes per session: Not on file    Stress: Not on file   Relationships    Social connections:     Talks on phone: Not on file     Gets together: Not on file     Attends Islam service: Not on file     Active member of club or organization: Not on file     Attends meetings of clubs or organizations: Not on file     Relationship status: Not on file    Intimate partner violence:     Fear of current or ex partner: Not on file     Emotionally abused: Not on file     Physically abused: Not on file     Forced sexual activity: Not on file   Other Topics Concern    Not on file   Social History Narrative    Not on file     Past Medical History:   Diagnosis Date  Anxiety     Arthritis     Bleeding disorder ([place] [address] )     Breast CA ([place] [address] ) 2009    left     Breast cancer ([place] [address] ) 3548,5150    right    Chicken pox     Chronic pain disorder     Colon cancer ([place] [address] )     Depression     Endometriosis     History of blood transfusion 2009    Migraines     Seizures ([place] [address] ) 05/2018    TBI (traumatic brain injury) ([place] [address] )     no weakness    Von Willebrand disease ([place] [address] )     [id number] Acquired Padilla Lorena now resolved as per hematologist       Current Outpatient Medications:     acetaminophen (TYLENOL) 325 mg tablet, Take 650 mg by mouth every 6 (six) hours as needed for mild pain, Disp: , Rfl:     cholecalciferol (VITAMIN D3) 1,000 units tablet, Take 1,000 Units by mouth daily, Disp: , Rfl:     cyanocobalamin (VITAMIN B-12) 100 mcg tablet, Take by mouth daily, Disp: , Rfl:     ibuprofen (MOTRIN) 400 mg tablet, Take by mouth every 6 (six) hours as needed for mild pain, Disp: , Rfl:     buPROPion (WELLBUTRIN XL) 300 mg 24 hr tablet, Take 1 pill daily in the morning, Disp: 30 tablet, Rfl: 3    varenicline (CHANTIX) 1 mg tablet, One pill twice a day (Patient not taking: Reported on 10/10/2019), Disp: 180 tablet, Rfl: 1  Family History   Problem Relation Age of Onset    Heart disease Father     Breast cancer Neg Hx        There are no Patient Instructions on file for this visit          HIGINIO Ayala

## 2019-10-18 ENCOUNTER — APPOINTMENT (OUTPATIENT)
Dept: LAB | Facility: CLINIC | Age: 54
End: 2019-10-18
Payer: MEDICARE

## 2019-10-18 DIAGNOSIS — R32 URINARY INCONTINENCE, UNSPECIFIED TYPE: ICD-10-CM

## 2019-10-18 PROCEDURE — 87086 URINE CULTURE/COLONY COUNT: CPT

## 2019-10-19 LAB — BACTERIA UR CULT: NORMAL

## 2019-11-04 ENCOUNTER — ANESTHESIA EVENT (OUTPATIENT)
Dept: PERIOP | Facility: HOSPITAL | Age: 54
End: 2019-11-04
Payer: MEDICARE

## 2019-11-04 NOTE — ANESTHESIA PREPROCEDURE EVALUATION
Review of Systems/Medical History  Patient summary reviewed  Chart reviewed  No history of anesthetic complications     Cardiovascular  Negative cardio ROS    Pulmonary  Smoker cigarette smoker  , Tobacco cessation counseling given ,        GI/Hepatic    GI malignancy,   Comment: Hx of colon cancer, 1994       Comment: Urinary incontinence     Endo/Other  Negative endo/other ROS      GYN    Breast cancer left lumpectomy       Hematology  Negative hematology ROS      Musculoskeletal    Arthritis     Neurology  Seizures ,  Headaches,    Psychology   Anxiety, Depression ,   Chronic pain,            Physical Exam    Airway    Mallampati score: II  TM Distance: >3 FB  Neck ROM: full     Dental   No notable dental hx     Cardiovascular  Comment: Negative ROS, Rhythm: regular, Rate: normal, Cardiovascular exam normal    Pulmonary  Pulmonary exam normal Breath sounds clear to auscultation,     Other Findings        Anesthesia Plan  ASA Score- 3     Anesthesia Type- general with ASA Monitors  Additional Monitors:   Airway Plan: LMA  Plan Factors-    Induction- intravenous  Postoperative Plan- Plan for postoperative opioid use  Informed Consent- Anesthetic plan and risks discussed with patient  I personally reviewed this patient with the CRNA  Discussed and agreed on the Anesthesia Plan with the CRNA  Jorge Jackson Recent labs personally reviewed:  Lab Results   Component Value Date    WBC 11 57 (H) 09/12/2019    HGB 15 6 (H) 09/12/2019     09/12/2019     Lab Results   Component Value Date    K 4 2 03/20/2019    BUN 15 03/20/2019    CREATININE 0 74 03/20/2019     Lab Results   Component Value Date    PTT 33 10/30/2018      Lab Results   Component Value Date    INR 0 96 10/30/2018     Lab Results   Component Value Date    HGBA1C 5 3 06/25/2019       I, Ludwig Zavala MD, have personally seen and evaluated the patient prior to anesthetic care    I have reviewed the pre-anesthetic record, and other medical records if appropriate to the anesthetic care  If a CRNA is involved in the case, I have reviewed the CRNA assessment, if present, and agree  Risks/benefits and alternatives discussed with patient including possible PONV, sore throat, and possibility of rare anesthetic and surgical emergencies

## 2019-11-05 ENCOUNTER — ANESTHESIA (OUTPATIENT)
Dept: PERIOP | Facility: HOSPITAL | Age: 54
End: 2019-11-05
Payer: MEDICARE

## 2019-11-05 ENCOUNTER — HOSPITAL ENCOUNTER (OUTPATIENT)
Facility: HOSPITAL | Age: 54
Setting detail: OUTPATIENT SURGERY
Discharge: HOME | End: 2019-11-05
Attending: UROLOGY | Admitting: UROLOGY
Payer: MEDICARE

## 2019-11-05 VITALS
HEART RATE: 92 BPM | SYSTOLIC BLOOD PRESSURE: 118 MMHG | DIASTOLIC BLOOD PRESSURE: 92 MMHG | TEMPERATURE: 98.4 F | OXYGEN SATURATION: 98 % | RESPIRATION RATE: 18 BRPM

## 2019-11-05 DIAGNOSIS — N39.3 STRESS INCONTINENCE: Primary | ICD-10-CM

## 2019-11-05 PROCEDURE — C1769 GUIDE WIRE: HCPCS | Performed by: UROLOGY

## 2019-11-05 PROCEDURE — 57288 REPAIR BLADDER DEFECT: CPT | Performed by: UROLOGY

## 2019-11-05 PROCEDURE — C1771 REP DEV, URINARY, W/SLING: HCPCS | Performed by: UROLOGY

## 2019-11-05 DEVICE — TRANSOBTURATOR SLING SYSTEM WITH PRECISIONBLUE™ DESIGN
Type: IMPLANTABLE DEVICE | Site: VAGINA | Status: FUNCTIONAL
Brand: OBTRYX™ II SYSTEM - HALO

## 2019-11-05 RX ORDER — ALPRAZOLAM 0.5 MG/1
1 TABLET ORAL ONCE
Status: DISCONTINUED | OUTPATIENT
Start: 2019-11-05 | End: 2019-11-05

## 2019-11-05 RX ORDER — ONDANSETRON 2 MG/ML
4 INJECTION INTRAMUSCULAR; INTRAVENOUS ONCE AS NEEDED
Status: DISCONTINUED | OUTPATIENT
Start: 2019-11-05 | End: 2019-11-05 | Stop reason: HOSPADM

## 2019-11-05 RX ORDER — HYDROCODONE BITARTRATE AND ACETAMINOPHEN 5; 325 MG/1; MG/1
1 TABLET ORAL EVERY 4 HOURS PRN
Qty: 20 TABLET | Refills: 0 | Status: SHIPPED | OUTPATIENT
Start: 2019-11-05 | End: 2019-11-15

## 2019-11-05 RX ORDER — PROPOFOL 10 MG/ML
INJECTION, EMULSION INTRAVENOUS AS NEEDED
Status: DISCONTINUED | OUTPATIENT
Start: 2019-11-05 | End: 2019-11-05 | Stop reason: SURG

## 2019-11-05 RX ORDER — DIPHENHYDRAMINE HYDROCHLORIDE 50 MG/ML
12.5 INJECTION INTRAMUSCULAR; INTRAVENOUS ONCE AS NEEDED
Status: DISCONTINUED | OUTPATIENT
Start: 2019-11-05 | End: 2019-11-05 | Stop reason: HOSPADM

## 2019-11-05 RX ORDER — MAGNESIUM HYDROXIDE 1200 MG/15ML
LIQUID ORAL AS NEEDED
Status: DISCONTINUED | OUTPATIENT
Start: 2019-11-05 | End: 2019-11-05 | Stop reason: HOSPADM

## 2019-11-05 RX ORDER — LIDOCAINE HYDROCHLORIDE 10 MG/ML
INJECTION, SOLUTION INFILTRATION; PERINEURAL AS NEEDED
Status: DISCONTINUED | OUTPATIENT
Start: 2019-11-05 | End: 2019-11-05 | Stop reason: SURG

## 2019-11-05 RX ORDER — CLINDAMYCIN PHOSPHATE 900 MG/50ML
900 INJECTION INTRAVENOUS ONCE
Status: COMPLETED | OUTPATIENT
Start: 2019-11-05 | End: 2019-11-05

## 2019-11-05 RX ORDER — SODIUM CHLORIDE, SODIUM LACTATE, POTASSIUM CHLORIDE, CALCIUM CHLORIDE 600; 310; 30; 20 MG/100ML; MG/100ML; MG/100ML; MG/100ML
125 INJECTION, SOLUTION INTRAVENOUS CONTINUOUS
Status: DISCONTINUED | OUTPATIENT
Start: 2019-11-05 | End: 2019-11-05 | Stop reason: HOSPADM

## 2019-11-05 RX ORDER — LIDOCAINE HYDROCHLORIDE 10 MG/ML
0.5 INJECTION, SOLUTION EPIDURAL; INFILTRATION; INTRACAUDAL; PERINEURAL ONCE AS NEEDED
Status: DISCONTINUED | OUTPATIENT
Start: 2019-11-05 | End: 2019-11-05 | Stop reason: HOSPADM

## 2019-11-05 RX ORDER — FENTANYL CITRATE 50 UG/ML
INJECTION, SOLUTION INTRAMUSCULAR; INTRAVENOUS AS NEEDED
Status: DISCONTINUED | OUTPATIENT
Start: 2019-11-05 | End: 2019-11-05 | Stop reason: SURG

## 2019-11-05 RX ORDER — DEXAMETHASONE SODIUM PHOSPHATE 10 MG/ML
INJECTION, SOLUTION INTRAMUSCULAR; INTRAVENOUS AS NEEDED
Status: DISCONTINUED | OUTPATIENT
Start: 2019-11-05 | End: 2019-11-05 | Stop reason: SURG

## 2019-11-05 RX ORDER — FENTANYL CITRATE/PF 50 MCG/ML
50 SYRINGE (ML) INJECTION
Status: DISCONTINUED | OUTPATIENT
Start: 2019-11-05 | End: 2019-11-05 | Stop reason: HOSPADM

## 2019-11-05 RX ORDER — ONDANSETRON 2 MG/ML
INJECTION INTRAMUSCULAR; INTRAVENOUS AS NEEDED
Status: DISCONTINUED | OUTPATIENT
Start: 2019-11-05 | End: 2019-11-05 | Stop reason: SURG

## 2019-11-05 RX ORDER — LIDOCAINE HYDROCHLORIDE AND EPINEPHRINE 20; 5 MG/ML; UG/ML
INJECTION, SOLUTION EPIDURAL; INFILTRATION; INTRACAUDAL; PERINEURAL AS NEEDED
Status: DISCONTINUED | OUTPATIENT
Start: 2019-11-05 | End: 2019-11-05 | Stop reason: HOSPADM

## 2019-11-05 RX ORDER — HYDROCODONE BITARTRATE AND ACETAMINOPHEN 5; 325 MG/1; MG/1
1 TABLET ORAL EVERY 4 HOURS PRN
Status: DISCONTINUED | OUTPATIENT
Start: 2019-11-05 | End: 2019-11-05 | Stop reason: HOSPADM

## 2019-11-05 RX ORDER — METOCLOPRAMIDE HYDROCHLORIDE 5 MG/ML
10 INJECTION INTRAMUSCULAR; INTRAVENOUS ONCE AS NEEDED
Status: DISCONTINUED | OUTPATIENT
Start: 2019-11-05 | End: 2019-11-05 | Stop reason: HOSPADM

## 2019-11-05 RX ORDER — HYDROMORPHONE HCL/PF 1 MG/ML
0.5 SYRINGE (ML) INJECTION
Status: DISCONTINUED | OUTPATIENT
Start: 2019-11-05 | End: 2019-11-05 | Stop reason: HOSPADM

## 2019-11-05 RX ORDER — LORAZEPAM 2 MG/ML
1 INJECTION INTRAMUSCULAR ONCE
Status: COMPLETED | OUTPATIENT
Start: 2019-11-05 | End: 2019-11-05

## 2019-11-05 RX ORDER — HYDROMORPHONE HCL/PF 1 MG/ML
0.2 SYRINGE (ML) INJECTION
Status: DISCONTINUED | OUTPATIENT
Start: 2019-11-05 | End: 2019-11-05 | Stop reason: HOSPADM

## 2019-11-05 RX ADMIN — DEXAMETHASONE SODIUM PHOSPHATE 4 MG: 10 INJECTION, SOLUTION INTRAMUSCULAR; INTRAVENOUS at 14:47

## 2019-11-05 RX ADMIN — PROPOFOL 150 MG: 10 INJECTION, EMULSION INTRAVENOUS at 14:42

## 2019-11-05 RX ADMIN — ONDANSETRON 4 MG: 2 INJECTION INTRAMUSCULAR; INTRAVENOUS at 14:47

## 2019-11-05 RX ADMIN — SODIUM CHLORIDE, SODIUM LACTATE, POTASSIUM CHLORIDE, AND CALCIUM CHLORIDE: .6; .31; .03; .02 INJECTION, SOLUTION INTRAVENOUS at 14:13

## 2019-11-05 RX ADMIN — HYDROCODONE BITARTRATE AND ACETAMINOPHEN 1 TABLET: 5; 325 TABLET ORAL at 16:53

## 2019-11-05 RX ADMIN — FENTANYL CITRATE 50 MCG: 50 INJECTION INTRAMUSCULAR; INTRAVENOUS at 14:57

## 2019-11-05 RX ADMIN — CLINDAMYCIN PHOSPHATE 900 MG: 18 INJECTION, SOLUTION INTRAMUSCULAR; INTRAVENOUS at 14:27

## 2019-11-05 RX ADMIN — LIDOCAINE HYDROCHLORIDE 60 MG: 10 INJECTION, SOLUTION INFILTRATION; PERINEURAL at 14:42

## 2019-11-05 RX ADMIN — FENTANYL CITRATE 50 MCG: 50 INJECTION, SOLUTION INTRAMUSCULAR; INTRAVENOUS at 16:04

## 2019-11-05 RX ADMIN — LORAZEPAM 1 MG: 2 INJECTION INTRAMUSCULAR; INTRAVENOUS at 15:57

## 2019-11-05 NOTE — DISCHARGE INSTRUCTIONS
Kayley Durant may shower on Thursday  No tubs or pools for 2 weeks  Nothing per vagina, strenuous exercise, or lifting greater than 10 lb for 6 weeks  Follow-up with surgeon in 2 weeks  Bladder Sling Procedure   WHAT YOU NEED TO KNOW:   A bladder sling procedure is surgery to treat urinary incontinence in women  The sling acts as a hammock to keep your urethra in place and hold it closed when your bladder is full  You may have vaginal bleeding or discharge for up to a week after your surgery  Use sanitary pads  Do not use tampons  You may have some pelvic discomfort or trouble urinating  DISCHARGE INSTRUCTIONS:   Call 911 for any of the following:   · You have sudden trouble breathing  Seek care immediately if:   · Your bleeding gets worse  · You have yellow or foul smelling discharge from your vagina  · You cannot urinate, or you are urinating less than what is normal for you  · You feel confused  Contact your healthcare provider if:   · You have a fever  · You do not feel like you are able to empty your bladder completely when you urinate  · You feel the need to urinate very suddenly  · You have burning or stinging when you urinate  · You have blood in your urine  · Your skin is itchy, swollen, or you have a rash  · You have questions or concerns about your condition or care  Medicines:   · Prescription pain medicine  may be given  Ask your how to take this medicine safely  · Take your medicine as directed  Contact your healthcare provider if you think your medicine is not helping or if you have side effects  Tell him or her if you are allergic to any medicine  Keep a list of the medicines, vitamins, and herbs you take  Include the amounts, and when and why you take them  Bring the list or the pill bottles to follow-up visits  Carry your medicine list with you in case of an emergency    Self-catheterization:  You may need to put a catheter into your bladder after you urinate to empty any remaining urine  A catheter is a small rubber tube used to drain urine  Healthcare providers will teach you how to put the catheter in safely  This may be needed until you are completely emptying your bladder  Mcintosh catheter: You may have a Mcintosh catheter for a short period of time  The Mcintosh is a tube put into your bladder to drain urine into a bag  Keep the bag below your waist  This will prevent urine from flowing back into your bladder and causing an infection or other problems  Also, keep the tube free of kinks so the urine will drain properly  Do not pull on the catheter  This can cause pain and bleeding, and may cause the catheter to come out  Activity:  Do not lift heavy objects for 6 weeks after your procedure  Do not have intercourse for 4 to 6 weeks  Do not use a tampon for 4 weeks  Ask your healthcare provider when you can return to work or your usual activities  Do pelvic muscle exercises: These are also called Kegel exercises  These exercises help strengthen your pelvic muscles and help prevent urine leakage  Tighten the muscles of your pelvis and hold them tight for 5 seconds, then relax for 5 seconds  Gradually work up to tightening them for 10 seconds and relaxing for 10 seconds  Do this 3 times each day  Keep a record:  Keep a record of when you urinate and if you leak any urine  Write down what you were doing when you leaked urine, such as coughing or sneezing  Bring the log to your follow-up visits  Prevent constipation:  Drink liquids as directed  You may need to drink more water than usual to soften your bowel movements  Eat a variety of healthy foods, especially fruit and foods high in fiber  You may need to use an over-the-counter bowel movement softener  Follow up with your healthcare provider as directed: You may need a test to check how much urine remains in your bladder after you urinate  This will help show how the sling is working   Write down your questions so you remember to ask them during your visits  © 2017 [employer] Information is for End User's use only and may not be sold, redistributed or otherwise used for commercial purposes  All illustrations and images included in CareNotes® are the copyrighted property of A D A M , Inc  or Will Melton  The above information is an  only  It is not intended as medical advice for individual conditions or treatments  Talk to your doctor, nurse or pharmacist before following any medical regimen to see if it is safe and effective for you

## 2019-11-05 NOTE — ANESTHESIA POSTPROCEDURE EVALUATION
Post-Op Assessment Note    CV Status:  Stable  Pain Score: 0    Pain management: adequate     Mental Status:  Awake   Hydration Status:  Stable   PONV Controlled:  Controlled   Airway Patency:  Patent   Post Op Vitals Reviewed: Yes      Staff: CRNA           BP   149/72   Temp  98 4   Pulse  86   Resp   16   SpO2   100

## 2019-11-05 NOTE — OP NOTE
OPERATIVE REPORT  PATIENT NAME: Deshaun Reveles    :  1965  MRN: [id number]  Pt Location: AN OR ROOM 01    SURGERY DATE: 2019    Surgeon(s) and Role:     Unique Dia MD - Primary    Preop Diagnosis:  Stress incontinence [N39 3]    Post-Op Diagnosis Codes:     * Stress incontinence [N39 3]    Procedure(s) (LRB):  INSERTION PUBOVAGINAL SLING (FEMALE) (N/A)  CYSTOSCOPY (N/A)    Specimen(s):  * No specimens in log *    Estimated Blood Loss:   Minimal    Drains:  * No LDAs found *    Anesthesia Type:   General    Operative Indications:  Stress incontinence [N39 3]      Operative Findings:  Cystoscopy with no urothelial lesions or bladder injury noted  Complications:   None    Procedure and Technique:  After informed consent was obtained the patient was brought to the operating room  Preoperative antibiotics were given for infection prophylaxis  Bilateral sequential compressive devices were placed on the lower extremities for DVT prophylaxis  A timeout was performed to identify the patient and the surgery to be performed  The patient was then placed under General LMA anesthesia (LMA) and prepped and draped in standard sterile fashion in the dorsal lithotomy position  A weighted speculum was inserted into the vagina and both labia minora were tacked to the patient's inner thigh with a 0 silk suture  A 16 [place] Mcintosh catheter was placed per urethra and into the bladder with return of clear yellow urine  The urethra was grasped with an Allis clamp and the anterior vaginal wall was infiltrated with half percent Marcaine with epinephrine  A 1 5 cm incision was made in the anterior vaginal wall one fingerbreadth from the meatus  The dissection was carried out laterally on both sides of the urethra with sharp Metzenbaum scissors  Stab incisions were made just lateral to the inguinal crease at the level of the clitoris  The sites were infiltrated with Marcaine prior to the incisions   The helical trocar was placed through the right stab incision and into the vaginal incision through the obturator foramen  The same procedure was done on the left side  The apex of the vagina was inspected to make sure we did not go through and through  The Mcinotsh catheter was removed and cystoscopy was performed with both 30 and 70° lenses  No violation of the bladder or urethra was noted  The sling was then placed onto the trochars and the trochars were backed out to pull the sling into the incision  Care was taken to make sure that the sling did not twist  A clamp was placed between the sling and urethra and the sling was tightened down to this clamp to make sure we did not tighten the sling too much  The protective covering over the mesh was removed and the sling was cut at the level of the skin at the stab incision sites  All incisions were copiously irrigated and the vaginal incision was reapproximated with 2-0 Vicryl suture in a running fashion  The stab incisions were reapproximated with 4-0 Monocryl  Histacryl was placed over top of these incisions  Vaginal packing soaked in Premarin cream was placed into the vagina  The patient was awoken and taken to the postanesthesia care unit in stable condition     I was present for the entire procedure    Patient Disposition:  PACU     SIGNATURE: Saumya Guerra MD  DATE: November 5, 2019  TIME: 3:37 PM

## 2019-11-05 NOTE — PROGRESS NOTES
Patient questioned about opium allergy; states it is not an allergy but has had seizures in the past due to taking a large quantity of pain medication and withdrawal from the medication  Patient aware of side effects of pain medication and feels comfortable taking it after this procedure  Pharmacy aware

## 2019-11-05 NOTE — INTERVAL H&P NOTE
H&P reviewed  After examining the patient I find no changes in the patients condition since the H&P had been written  Plan for mid urethral sling today      Vitals:    11/05/19 1214   BP: 122/82   Pulse: 83   Resp: 16   Temp: (!) 96 °F (35 6 °C)   SpO2: 98%

## 2019-11-06 ENCOUNTER — TELEPHONE (OUTPATIENT)
Dept: UROLOGY | Facility: AMBULATORY SURGERY CENTER | Age: 54
End: 2019-11-06

## 2019-11-06 NOTE — TELEPHONE ENCOUNTER
Post Op Note    Leo Diaz is a 49 y o  female s/p INSERTION PUBOVAGINAL SLING (FEMALE) CYSTOSCOPY (N/A)  performed 11/5/2019  Leo Diaz is a patient of Dr Flaca Suarez and is seen at the [facility] office  How would you rate your pain on a scale from 1 to 10, 10 being the worst pain ever? 7  Have you had a fever? No  Have your bowel movements been regular? Yes  Do you have any difficulty urinating? No  If the patient has an incision- do you have any redness around the incision or any drainage from the incision? No    Do you have any other questions or concerns that I can address at this time? No      Patient is scheduled for post op visit with Dr Flaca Suarez on 11/19  Patient knows to call office in meantime with any concerns

## 2019-11-15 NOTE — TELEPHONE ENCOUNTER
Called Mahad Jean back and reiterated per Jose Alfredo Trinidad that it can be normal to have some bleeding  Again stressed no heavy lifting and to hydrate, which she said she is  She will see Dr Osvaldo Roth on Tuesday

## 2019-11-15 NOTE — TELEPHONE ENCOUNTER
Reassure patient that it is not abnormal to have some spotting after recent surgery  This can take several weeks to completely resolve

## 2019-11-15 NOTE — TELEPHONE ENCOUNTER
Spoke to patient and she has has a small amount of bleeding, the blood isn't even red in color, moving bowels well, reduced activity and states she is resting and elevating legs and trying to hydrate as best as she can  She is scheduled with Dr aNresh Eckert on 11/19/19 but she was just curious if she should be concerned about the bleeding?

## 2019-11-19 ENCOUNTER — OFFICE VISIT (OUTPATIENT)
Dept: UROLOGY | Facility: AMBULATORY SURGERY CENTER | Age: 54
End: 2019-11-19
Payer: MEDICARE

## 2019-11-19 VITALS
BODY MASS INDEX: 32.61 KG/M2 | HEIGHT: 64 IN | HEART RATE: 103 BPM | SYSTOLIC BLOOD PRESSURE: 116 MMHG | WEIGHT: 191 LBS | DIASTOLIC BLOOD PRESSURE: 72 MMHG

## 2019-11-19 DIAGNOSIS — R32 URINARY INCONTINENCE, UNSPECIFIED TYPE: Primary | ICD-10-CM

## 2019-11-19 LAB — POST-VOID RESIDUAL VOLUME, ML POC: 0 ML

## 2019-11-19 PROCEDURE — 51798 US URINE CAPACITY MEASURE: CPT | Performed by: UROLOGY

## 2019-11-19 PROCEDURE — 99024 POSTOP FOLLOW-UP VISIT: CPT | Performed by: UROLOGY

## 2019-11-19 RX ORDER — IPRATROPIUM BROMIDE 42 UG/1
SPRAY, METERED NASAL
Refills: 0 | COMMUNITY
Start: 2019-10-09

## 2019-11-19 NOTE — ASSESSMENT & PLAN NOTE
I am very happy with how she is healing  I think her buttock pain will continue to improve  She is to contact us if it does not  She is emptying her bladder well  She will follow up on an as-needed basis

## 2019-11-19 NOTE — PROGRESS NOTES
Assessment/Plan:    Urinary incontinence  I am very happy with how she is healing  I think her buttock pain will continue to improve  She is to contact us if it does not  She is emptying her bladder well  She will follow up on an as-needed basis  Diagnoses and all orders for this visit:    Urinary incontinence, unspecified type  -     POCT Measure PVR    Other orders  -     ipratropium (ATROVENT) 0 06 % nasal spray; SPRAY 2 SPRAYS INTO EACH NOSTRIL TWICE A DAY              Subjective:     Patient ID: Leo Diaz is a 49 y o  female    71-year-old female presents for follow-up after mid urethral sling  The patient is having right-sided buttock pain but otherwise doing very well  She is still having some mild bleeding per vagina at times  She denies any leaking since surgery  She has no other complaints  Review of Systems   Constitutional: Negative  HENT: Negative  Eyes: Negative  Respiratory: Negative  Cardiovascular: Negative  Gastrointestinal: Negative  Endocrine: Negative  Genitourinary:        As noted per HPI   Musculoskeletal: Negative  Skin: Negative  Allergic/Immunologic: Negative  Neurological: Negative  Hematological: Negative  Psychiatric/Behavioral: Negative  Objective:    Physical Exam   Constitutional: She is oriented to person, place, and time  She appears well-developed and well-nourished  HENT:   Head: Normocephalic and atraumatic  Neck: Normal range of motion  Neck supple  Cardiovascular: Intact distal pulses  Pulmonary/Chest: Effort normal    Abdominal: Soft  Bowel sounds are normal  She exhibits no distension and no mass  There is no tenderness  There is no rebound and no guarding  Musculoskeletal: Normal range of motion  Neurological: She is alert and oriented to person, place, and time  Skin: Skin is warm and dry  Psychiatric: She has a normal mood and affect  Vitals reviewed          Results  No results found for: PSA  Lab Results   Component Value Date    CALCIUM 9 0 03/20/2019    K 4 2 03/20/2019    CO2 28 03/20/2019     03/20/2019    BUN 15 03/20/2019    CREATININE 0 74 03/20/2019     Lab Results   Component Value Date    WBC 11 57 (H) 09/12/2019    HGB 15 6 (H) 09/12/2019    HCT 49 2 (H) 09/12/2019    MCV 91 09/12/2019     09/12/2019       Recent Results (from the past 1 hour(s))   POCT Measure PVR    Collection Time: 11/19/19  3:26 PM   Result Value Ref Range    POST-VOID RESIDUAL VOLUME, ML POC 0 mL   ]

## 2019-12-17 ENCOUNTER — TELEPHONE (OUTPATIENT)
Dept: OBGYN CLINIC | Facility: CLINIC | Age: 54
End: 2019-12-17

## 2019-12-17 ENCOUNTER — TELEPHONE (OUTPATIENT)
Dept: MAMMOGRAPHY | Facility: CLINIC | Age: 54
End: 2019-12-17

## 2019-12-17 ENCOUNTER — TELEPHONE (OUTPATIENT)
Dept: HEMATOLOGY ONCOLOGY | Facility: CLINIC | Age: 54
End: 2019-12-17

## 2019-12-17 NOTE — TELEPHONE ENCOUNTER
Returned Pt's call today  Pt states she has been in direct contact via phone with Dr Deshaun Hernandez office regarding scheduling of breast MRI  Pt further states Dr Deshaun Hernandez office staff informed her that Dr Latasha Oates would return Pt's call after reviewing Pt's case history  Reiterated to Pt that if she has any questions/concerns to contact office

## 2019-12-17 NOTE — TELEPHONE ENCOUNTER
Call placed to patient, explained that I spoke with our radiologist about her breast imaging, adv that radiologist wants her to have bilateral breast MRI vs diagnostic mammo/US, pt states understanding, states she already requested imaging order from [address] office, adv I will contact their office and have them order MRI, pt states she is making appointment with Dr Daniel King office as well    Call placed to [address] [facility] office, adv radiologist recommendation for bilateral breast MRI, state they will place order for bilateral breast MRI and call patient after it is ordered

## 2019-12-17 NOTE — TELEPHONE ENCOUNTER
New Patient Encounter    New Patient Intake Form   Patient Details:  Deshaun Reveles  1965  [id number]    Background Information:  [address] starts by opening a telephone encounter and gathering the following information   Who is calling to schedule? If not self, relationship to patient? self   Referring Provider    What is the diagnosis? Possible DCIS of breast   When was the diagnosis? May 7   Is patient aware of diagnosis? Yes   Reason for visit? Consult   Have you had any testing done? If so: when, where? Yes  Mammogram, ultrasound, MRI   Are records in [facility]? yes   Was the patient told to bring a disk? no   Scheduling Information:   Preferred [place]: [place] and [place]     Requesting Specific Provider? Dr Satnam Morel    Are there any dates/time the patient cannot be seen? n/a      Miscellaneous:    After completing the above information, please route to Financial Counselor and the appropriate Nurse Navigator for review

## 2019-12-17 NOTE — TELEPHONE ENCOUNTER
Call placed to patient, states that she has been trying to coordinate with her breast surgeon in [place] who keeps saying they don't have the appropriate images they need from here to see her, pt states she is frustrated, states she is aware that she needs follow up from May, pt states she wants to get imaging now again since it has been 6 months, adv she needs to contact [address] office to get that ordered, pt also urged to make an appointment with breast surgeon in our area, pt states she was recommended to make appointment with Dr Aziza Villanueva, offered pt phone numbers, pt states she has them, pt states she is going to contact everyone now to make appointments    Spoke with Dr Delores Pearce about pt breast imaging, adv that pt best course of action right now is a repeat bilateral breast MRI vs repeat diagnostic mammo/US

## 2019-12-17 NOTE — TELEPHONE ENCOUNTER
Patient line disconnected and called back to continue intake  Patient asks that you make an appointment for her and leave a message of the time, date, location of the appointment if she does not

## 2019-12-17 NOTE — TELEPHONE ENCOUNTER
Pt was non compliant and never went for her breast biopsy as recommended, per lorrie at breast center, pt needs to have an mri of breast since she wont go for the biopsy (per radiologist) pt just wanted another mammo but the breast center said no, ok to order? ?

## 2019-12-17 NOTE — TELEPHONE ENCOUNTER
Patient was mailed a referral  On June 20, 2019 stating she needed a R breast MRI guided breast biopsy and needed to make an appointment with Dr Shilpi García, office states that they have heard nothing from patient    No appointments made by patient in her chart, will make 6 month outreach to patient at this time

## 2019-12-17 NOTE — TELEPHONE ENCOUNTER
Patient will be meeting with Kobi Diaz for a breast surgery consult  Patient needs updated breast imaging for the appointment  Patient needs bilateral mammograms and breast ultrasounds

## 2019-12-18 ENCOUNTER — TELEPHONE (OUTPATIENT)
Dept: HEMATOLOGY ONCOLOGY | Facility: CLINIC | Age: 54
End: 2019-12-18

## 2020-01-20 ENCOUNTER — CONSULT (OUTPATIENT)
Dept: SURGICAL ONCOLOGY | Facility: CLINIC | Age: 55
End: 2020-01-20
Payer: MEDICARE

## 2020-01-20 VITALS
TEMPERATURE: 98.2 F | BODY MASS INDEX: 32.74 KG/M2 | HEIGHT: 64 IN | WEIGHT: 191.8 LBS | SYSTOLIC BLOOD PRESSURE: 120 MMHG | DIASTOLIC BLOOD PRESSURE: 82 MMHG | HEART RATE: 74 BPM

## 2020-01-20 DIAGNOSIS — C50.911 BILATERAL MALIGNANT NEOPLASM OF BREAST IN FEMALE, UNSPECIFIED ESTROGEN RECEPTOR STATUS, UNSPECIFIED SITE OF BREAST (HCC): ICD-10-CM

## 2020-01-20 DIAGNOSIS — Z85.038 HISTORY OF COLON CANCER: ICD-10-CM

## 2020-01-20 DIAGNOSIS — C50.912 BILATERAL MALIGNANT NEOPLASM OF BREAST IN FEMALE, UNSPECIFIED ESTROGEN RECEPTOR STATUS, UNSPECIFIED SITE OF BREAST (HCC): ICD-10-CM

## 2020-01-20 DIAGNOSIS — R92.8 ABNORMAL FINDING ON BREAST IMAGING: Primary | ICD-10-CM

## 2020-01-20 PROCEDURE — 99204 OFFICE O/P NEW MOD 45 MIN: CPT | Performed by: NURSE PRACTITIONER

## 2020-01-20 NOTE — PROGRESS NOTES
Surgical Oncology Follow Up       [facility]  [facility] SUR[facility]Y [facility] [id number]    Maurice Acosta  1965  [id number]      Chief Complaint   Patient presents with    Consult       Assessment/Plan:  1  Abnormal finding on breast imaging  - Mammo diagnostic bilateral w 3d & cad; Future  - Will most likely need repeat breast MRI with potential MRI guided biopsy  This will be determined after mammogram      2  Bilateral malignant neoplasm of breast in female, unspecified estrogen receptor status, unspecified site of breast [facility])  - Ambulatory Referral to Oncology Genetics; Future  - Mammo diagnostic bilateral w 3d & cad; Future  - 3 mo f/u visit     3  History of colon cancer  - Ambulatory Referral to Oncology Genetics; Future      Discussion/Summary:  Patient is a 60-year-old female with an extensive cancer history to include 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, left breast 2009) and colon cancer (s/p left hemicolectomy in 1994)  She had several breast diagnostic imaging studies performed and the latest recommendation was for an MRI guided biopsy of right breast however this was in May of 2019  The patient was unsure if she was going to continue further workup with our institution or return to her former institution and therefore has not undergone this biopsy  She presents today to coordinate her care  She notices no changes on her exam and I have no concerns on her breast exam today  She does report occasional nipple discharge bilaterally which is usually not spontaneous in nature  This has been ongoing for several years for her     I did discuss her case with Dr Andrade Cunningham (radiology) and I have recommended the patient have a bilateral 3D diagnostic mammogram   If this is stable, she will most likely need an MRI at that time with a possible MRI guided biopsy of the right breast   Once I have the results of her mammogram I will call her with further recommendations  I also reviewed her personal cancer history I recommended repeat genetic testing as she states that her testing was performed in 2009 and this was likely a smaller gene panel  The patient does state that she has several questions about further genetic testing and I have encouraged her to discuss these with our oncology Genetics counselor  Referral made  I will tentatively plan to see the patient back in 3 months however I will call her with the result of her mammogram and further recommendations will be made at that time  I also encouraged smoking cessation  All of her questions were answered today  History of Present Illness:     -Interval History:  Patient presents today as a new consult for abnormal breast imaging  She has a history of 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, left breast 2009) and colon cancer (s/p left hemicolectomy)  She states she underwent genetic testing in 2009 and reports her results were negative, but she has no copy of her results  She had a bilateral 3D screening mammogram in October 2018 which was BI-RADS 0  In the left mid medial breast there were 2 adjacent nodules and further diagnostic imaging was recommended  She had a left diagnostic mammogram and ultrasound performed on October 31, 2018 which revealed a simple cyst and complicated cysts  A six-month follow-up mammogram and ultrasound was recommended  This was performed on May 6, 2019 and was stable  However, given the complex parenchymal pattern and new onset of left bloody nipple discharge, an MRI was recommended  This was performed on May 23, 2019  There was a 7 x 6 mm irregularly-shaped mass at 12:00 position of the right breast, 6 cm from the nipple  An ultrasound was recommended for further evaluation  There were no abnormal left breast findings at that time  The right breast ultrasound was then performed on May 29, 2019  Several cysts were seen    At the 12 o'clock position, 7 cm from the nipple there was a circumscribed isoechoic area with a few internal cystic spaces measuring 1 x 0 7 x 0 4 cm  This did not appear worrisome however this was not a definitive correlate for the MRI finding  An MRI guided biopsy was recommended  The patient elected to follow up with her breast surgeon at [facility] however has experienced issues with the coordination of care and now wishes to proceed with further work up here at [facility]  Menarche age 16, 1 pregnancies, 3 live births  She was 20 at the time her 1st child was born  She used birth control pills for approximately 5 years  She has never used hormone replacement therapy  She has a personal history of colon and breast and skin cancer  Her family history is significant for pancreatic cancer in a maternal grandmother  She is not of Ashkenazi Protestant descent  She is a current smoker and has smoked for 38 years  She is currently smoking 1 pack per day  She does not consume alcohol  Review of Systems:  Review of Systems   Constitutional: Positive for unexpected weight change (Reports 60 lb weight gain)  Negative for activity change, appetite change, chills, fatigue and fever  HENT: Negative for trouble swallowing  Eyes: Negative for pain, redness and visual disturbance  Respiratory: Positive for cough  Negative for shortness of breath and wheezing  Cardiovascular: Negative for chest pain, palpitations and leg swelling  Gastrointestinal: Negative for abdominal pain, constipation, diarrhea, nausea and vomiting  Endocrine: Positive for heat intolerance ( reports night sweats)  Negative for cold intolerance  Musculoskeletal: Positive for arthralgias  Negative for back pain, gait problem and myalgias  Skin: Negative for color change and rash  Neurological: Positive for seizures and headaches  Negative for dizziness, syncope, light-headedness and numbness     Hematological: Negative for adenopathy  Psychiatric/Behavioral: Negative for agitation and confusion  All other systems reviewed and are negative  Patient Active Problem List   Diagnosis    Climacteric    Encounter for gynecological examination without abnormal finding    Cough    Weight gain    Bilateral malignant neoplasm of breast in female [facility])    History of colon cancer    Urinary incontinence    Leukocytosis    Tobacco use    Headaches due to old head injury    Hematuria    Chronic sinusitis    Right upper quadrant abdominal pain    Abnormal chest x-ray    Abnormal mammogram    Stress incontinence    Abnormal finding on breast imaging     Past Medical History:   Diagnosis Date    Anxiety     Arthritis     Bleeding disorder ([facility] [address] [facility]     Breast CA ([facility] [address] ) 2009    left     Breast cancer ([facility] [address] ) 4730,4740    right    Chicken pox     Chronic pain disorder     Colon cancer ([facility] [address] [facility]     Depression     Endometriosis     History of blood transfusion     Migraines     Seizures ([facility] [address] ) 2018    TBI (traumatic brain injury) ([facility] [address] )     no weakness    Von Willebrand disease ([facility] [address] )     [id number] Acquired Gary Lied now resolved as per hematologist     Past Surgical History:   Procedure Laterality Date    BREAST LUMPECTOMY Bilateral  and 2009    BREAST SURGERY       SECTION      COLECTOMY      COLONOSCOPY      CYSTOSCOPY N/A 2019    Procedure: CYSTOSCOPY;  Surgeon: Charo Sewell MD;  [facility];  Service: Urology    DILATION AND CURETTAGE OF UTERUS      EYE SURGERY Right     HYSTERECTOMY  2014    JOINT REPLACEMENT Bilateral ,,2009    THR    OTHER SURGICAL HISTORY      minimally invasive neuro surgery    OK SLING OPER STRES INCONTINENCE N/A 2019    Procedure: INSERTION PUBOVAGINAL SLING (FEMALE);   Surgeon: Charo Sewell MD;  [facility];  Service: Urology    SINUS SURGERY      TOTAL HIP ARTHROPLASTY       Family History   Problem Relation Age of Onset    Heart disease Father     Breast cancer Neg Hx      Social History     Socioeconomic History    Marital status: /Civil Union     Spouse name: Not on file    Number of children: Not on file    Years of education: Not on file    Highest education level: Not on file   Occupational History    Not on file   Social Needs    Financial resource strain: Not on file    Food insecurity:     Worry: Not on file     Inability: Not on file    Transportation needs:     Medical: Not on file     Non-medical: Not on file   Tobacco Use    Smoking status: Current Every Day Smoker     Packs/day: 1 00     Types: Cigarettes    Smokeless tobacco: Never Used   Substance and Sexual Activity    Alcohol use: No    Drug use: No    Sexual activity: Yes     Partners: Male     Birth control/protection: Post-menopausal   Lifestyle    Physical activity:     Days per week: Not on file     Minutes per session: Not on file    Stress: Not on file   Relationships    Social connections:     Talks on phone: Not on file     Gets together: Not on file     Attends Jewish service: Not on file     Active member of club or organization: Not on file     Attends meetings of clubs or organizations: Not on file     Relationship status: Not on file    Intimate partner violence:     Fear of current or ex partner: Not on file     Emotionally abused: Not on file     Physically abused: Not on file     Forced sexual activity: Not on file   Other Topics Concern    Not on file   Social History Narrative    Not on file       Current Outpatient Medications:     acetaminophen (TYLENOL) 325 mg tablet, Take 650 mg by mouth every 6 (six) hours as needed for mild pain, Disp: , Rfl:     cholecalciferol (VITAMIN D3) 1,000 units tablet, Take 1,000 Units by mouth daily, Disp: , Rfl:     cyanocobalamin (VITAMIN B-12) 100 mcg tablet, Take by mouth daily, Disp: , Rfl:     ibuprofen (MOTRIN) 400 mg tablet, Take by mouth every 6 (six) hours as needed for mild pain, Disp: , Rfl:     buPROPion (WELLBUTRIN XL) 300 mg 24 hr tablet, Take 1 pill daily in the morning (Patient not taking: Reported on 1/20/2020), Disp: 30 tablet, Rfl: 3    ipratropium (ATROVENT) 0 06 % nasal spray, SPRAY 2 SPRAYS INTO EACH NOSTRIL TWICE A DAY, Disp: , Rfl: 0    varenicline (CHANTIX) 1 mg tablet, One pill twice a day (Patient not taking: Reported on 1/20/2020), Disp: 180 tablet, Rfl: 1  Allergies   Allergen Reactions    Penicillins Anaphylaxis    Opium Seizures     Vitals:    01/20/20 1043   BP: 120/82   Pulse: 74   Temp: 98 2 °F (36 8 °C)       Physical Exam   Constitutional: She is oriented to person, place, and time  Vital signs are normal  She appears well-developed and well-nourished  No distress  HENT:   Head: Normocephalic and atraumatic  Neck: Normal range of motion  Cardiovascular: Normal rate, regular rhythm and normal heart sounds  Pulmonary/Chest: Effort normal and breath sounds normal    Bilateral breasts were examined in the sitting and supine position  Bilateral breast surgical scars present  There are no masses, skin nodules, nipple changes or nipple discharge  There is no bilateral supraclavicular or axillary lymphadenopathy noted  Abdominal: Soft  Normal appearance  She exhibits no mass  There is no hepatosplenomegaly  There is no tenderness  Musculoskeletal: Normal range of motion  Lymphadenopathy:     She has no axillary adenopathy  Right: No supraclavicular adenopathy present  Left: No supraclavicular adenopathy present  Neurological: She is alert and oriented to person, place, and time  Skin: Skin is warm, dry and intact  No rash noted  She is not diaphoretic  Psychiatric: She has a normal mood and affect  Her speech is normal    Vitals reviewed  Advance Care Planning/Advance Directives:  Discussed disease status and treatment goals with the patient

## 2020-02-03 ENCOUNTER — HOSPITAL ENCOUNTER (OUTPATIENT)
Dept: MAMMOGRAPHY | Facility: CLINIC | Age: 55
Discharge: HOME | End: 2020-02-03
Payer: MEDICARE

## 2020-02-03 ENCOUNTER — HOSPITAL ENCOUNTER (OUTPATIENT)
Dept: ULTRASOUND IMAGING | Facility: CLINIC | Age: 55
Discharge: HOME | End: 2020-02-03
Payer: MEDICARE

## 2020-02-03 VITALS — WEIGHT: 195 LBS | HEIGHT: 64 IN | BODY MASS INDEX: 33.29 KG/M2

## 2020-02-03 DIAGNOSIS — C50.911 BILATERAL MALIGNANT NEOPLASM OF BREAST IN FEMALE, UNSPECIFIED ESTROGEN RECEPTOR STATUS, UNSPECIFIED SITE OF BREAST (HCC): ICD-10-CM

## 2020-02-03 DIAGNOSIS — N64.52 NIPPLE DISCHARGE: ICD-10-CM

## 2020-02-03 DIAGNOSIS — R92.8 ABNORMAL FINDING ON BREAST IMAGING: ICD-10-CM

## 2020-02-03 DIAGNOSIS — C50.912 BILATERAL MALIGNANT NEOPLASM OF BREAST IN FEMALE, UNSPECIFIED ESTROGEN RECEPTOR STATUS, UNSPECIFIED SITE OF BREAST (HCC): ICD-10-CM

## 2020-02-03 PROCEDURE — 77066 DX MAMMO INCL CAD BI: CPT

## 2020-02-03 PROCEDURE — 76642 ULTRASOUND BREAST LIMITED: CPT

## 2020-02-03 PROCEDURE — G0279 TOMOSYNTHESIS, MAMMO: HCPCS

## 2020-02-03 NOTE — PROGRESS NOTES
Met with patient and Dr Sena Jade regarding recommendation for;    __X___ RIGHT ______LEFT      __X___Ultrasound guided  ______Stereotactic breast biopsy  __X___Verbalized understanding        Blood thinners:  _____yes __X___no    Date stopped: ___N/A____    Biopsy teaching sheet given:  __X___yes ____no    Pt given contact information and adv to call with any questions/needs

## 2020-02-07 ENCOUNTER — HOSPITAL ENCOUNTER (OUTPATIENT)
Dept: ULTRASOUND IMAGING | Facility: CLINIC | Age: 55
Discharge: HOME | End: 2020-02-07

## 2020-02-11 ENCOUNTER — HOSPITAL ENCOUNTER (OUTPATIENT)
Dept: MAMMOGRAPHY | Facility: CLINIC | Age: 55
Discharge: HOME | End: 2020-02-11

## 2020-02-11 ENCOUNTER — HOSPITAL ENCOUNTER (OUTPATIENT)
Dept: ULTRASOUND IMAGING | Facility: CLINIC | Age: 55
Discharge: HOME | End: 2020-02-11
Payer: MEDICARE

## 2020-02-11 VITALS — SYSTOLIC BLOOD PRESSURE: 131 MMHG | HEART RATE: 91 BPM | DIASTOLIC BLOOD PRESSURE: 89 MMHG

## 2020-02-11 DIAGNOSIS — R92.8 ABNORMAL MAMMOGRAM: ICD-10-CM

## 2020-02-11 PROCEDURE — 88305 TISSUE EXAM BY PATHOLOGIST: CPT | Performed by: PATHOLOGY

## 2020-02-11 PROCEDURE — 19083 BX BREAST 1ST LESION US IMAG: CPT

## 2020-02-11 RX ORDER — LIDOCAINE HYDROCHLORIDE 10 MG/ML
5 INJECTION, SOLUTION EPIDURAL; INFILTRATION; INTRACAUDAL; PERINEURAL ONCE
Status: COMPLETED | OUTPATIENT
Start: 2020-02-11 | End: 2020-02-11

## 2020-02-11 RX ADMIN — LIDOCAINE HYDROCHLORIDE 5 ML: 10 INJECTION, SOLUTION EPIDURAL; INFILTRATION; INTRACAUDAL; PERINEURAL at 09:02

## 2020-02-11 NOTE — PROGRESS NOTES
Ice pack given:    ___x__yes _____no    Discharge instructions signed by patient:    ___x__yes _____no    Discharge instructions given to patient:    ___x__yes _____no    Discharged via:    ___x__amulatory    _____wheelchair    _____stretcher    Stable on discharge:    ___x__yes ____no  Ordering to give patient results

## 2020-02-11 NOTE — DISCHARGE INSTR - OTHER ORDERS
POST LARGE CORE BREAST BIOPSY PATIENT INFORMATION      1  Place an ice pack inside your bra over the top of the dressing every hour for 20 minutes (20 minutes on, 60 minutes off)  Do this until bedtime  2  Do not shower or bathe until the following morning  3  You may bathe your breast carefully with the steri-strips in place  Be careful    Not to loosen them  The steri-strips will fall off in 3-5 days  4  You may have mild discomfort, and you may have some bruising where the   Needle entered the skin  This should clear within 5-7 days  5  If you need medicine for discomfort, take acetaminophen products such as   Tylenol  You may also take Advil or Motrin products  6  Do not participate in strenuous activities such as-tennis, aerobics, skiing,  Weight lifting, etc  for 24 hours  Refrain from swimming/soaking for 72 hours  7  Wearing a bra for sleeping may be more comfortable for the first 24-48 hours  8  Watch for continued bleeding, pain or fever over 101; please call with any questions or concerns  For procedures done at the [facility]  [address] call:  Gayle Wilson RN at [phone]  Sarina Arias RN at [phone]                    *After 4 PM call the Interventional Radiology Department                    [phone] and ask to speak with the nurse on call  For procedures done at the [address] [place] [address] call:         Debby Mayo RN at   *After 4 PM call the Interventional Radiology Department   [phone] and ask to speak with the nurse on call  For procedures done at [address] [place] [address] call: The Radiology Nurse at [phone]  *After 4 PM call your physician, or go to the Emergency Department  9          The final results of your biopsy are usually available within one week

## 2020-02-11 NOTE — PROGRESS NOTES
Procedure type:    __x___ultrasound guided _____stereotactic    Breast:    _____Left ___x__Right    Location:12 o'clock antiradial 7 cmfn    Needle: 12 Gauge glory    # of passes: 3    Clip: Cylinder    Performed by: Dr Sindhu Bonilla held for 5 minutes by: Toshia Nieves    Sterronda Strips:    ___x__yes _____no    Band aid:    __x___yes_____no    Tape and guaze:    _____yes ___x__no    Tolerated procedure:    ___x__yes _____no

## 2020-02-11 NOTE — PROGRESS NOTES
Patient arrived via:    ___x__ambulatory    _____wheelchair    _____stretcher      Domestic violence screen    ___x___negative______positive    Breast Implants:    _______yes ___x_____no

## 2020-02-13 ENCOUNTER — TELEPHONE (OUTPATIENT)
Dept: SURGICAL ONCOLOGY | Facility: CLINIC | Age: 55
End: 2020-02-13

## 2020-02-13 DIAGNOSIS — N64.52 NIPPLE DISCHARGE: Primary | ICD-10-CM

## 2020-02-13 DIAGNOSIS — C50.912 BILATERAL MALIGNANT NEOPLASM OF BREAST IN FEMALE, UNSPECIFIED ESTROGEN RECEPTOR STATUS, UNSPECIFIED SITE OF BREAST (HCC): ICD-10-CM

## 2020-02-13 DIAGNOSIS — F40.240 CLAUSTROPHOBIA: ICD-10-CM

## 2020-02-13 DIAGNOSIS — C50.911 BILATERAL MALIGNANT NEOPLASM OF BREAST IN FEMALE, UNSPECIFIED ESTROGEN RECEPTOR STATUS, UNSPECIFIED SITE OF BREAST (HCC): ICD-10-CM

## 2020-02-13 RX ORDER — LORAZEPAM 0.5 MG/1
TABLET ORAL
Qty: 2 TABLET | Refills: 0 | Status: SHIPPED | OUTPATIENT
Start: 2020-02-13

## 2020-02-13 NOTE — TELEPHONE ENCOUNTER
Spoke with the patient and reviewed pathology results-her right breast biopsy revealed cystic and papillary apocrine metaplasia, no malignancy or atypia  This is concordant  A bilateral breast MRI is recommended given patient's report of nipple discharge  The patient is requesting anti anxiety medication for her MRI  She does state that she has taken medication in the past   I will prescribe 2 Ativan so that she can undergo the breast MRI  PDMP checked- no concerns

## 2020-02-13 NOTE — PROGRESS NOTES
Post procedure call completed    Bleeding: _____yes __x___no    Pain: _____yes ____x__no    Redness/Swelling: __x____yes ______no    Band aid removed: __x___yes _____no    Steri-Strips intact: __x____yes _____no  Pt states she has a little swelling and bruising @ right breast bx site

## 2020-03-03 ENCOUNTER — HOSPITAL ENCOUNTER (OUTPATIENT)
Dept: MRI IMAGING | Facility: HOSPITAL | Age: 55
Discharge: HOME | End: 2020-03-03
Payer: MEDICARE

## 2020-03-03 DIAGNOSIS — C50.912 BILATERAL MALIGNANT NEOPLASM OF BREAST IN FEMALE, UNSPECIFIED ESTROGEN RECEPTOR STATUS, UNSPECIFIED SITE OF BREAST (HCC): ICD-10-CM

## 2020-03-03 DIAGNOSIS — C50.911 BILATERAL MALIGNANT NEOPLASM OF BREAST IN FEMALE, UNSPECIFIED ESTROGEN RECEPTOR STATUS, UNSPECIFIED SITE OF BREAST (HCC): ICD-10-CM

## 2020-03-03 DIAGNOSIS — N64.52 NIPPLE DISCHARGE: ICD-10-CM

## 2020-03-03 PROCEDURE — A9585 GADOBUTROL INJECTION: HCPCS | Performed by: NURSE PRACTITIONER

## 2020-03-03 PROCEDURE — C8908 MRI W/O FOL W/CONT, BREAST,: HCPCS

## 2020-03-03 PROCEDURE — C8937 CAD BREAST MRI: HCPCS

## 2020-03-03 RX ADMIN — GADOBUTROL 8 ML: 604.72 INJECTION INTRAVENOUS at 14:42

## 2020-03-10 ENCOUNTER — TELEPHONE (OUTPATIENT)
Dept: SURGICAL ONCOLOGY | Facility: CLINIC | Age: 55
End: 2020-03-10

## 2020-03-10 NOTE — TELEPHONE ENCOUNTER
Spoke with patient and reviewed results of breast MRI  There are no worrisome findings, although there are also no findings to explain the bilateral bloody nipple discharge  The patient states she often notices this when she wakes up in the morning and states she is a side sleeper  It is sometimes spontaneous  This has been ongoing for 15 years since her other breast surgeries  She states it is blood tinged 70% of the time  Since there is no target or explanation of her discharge on her MRI and I was not able to elicit any discharge on her last physical exam , I've recommended observation at this time  I did discuss this with Dr Pacheco Trimble  I will see her back next month for another clinical exam  I instructed her to call with any further changes in her discharge or on self breast exam  The patient is in agreement with this plan

## 2020-04-27 ENCOUNTER — TELEPHONE (OUTPATIENT)
Dept: SURGICAL ONCOLOGY | Facility: CLINIC | Age: 55
End: 2020-04-27

## 2020-04-29 ENCOUNTER — TELEPHONE (OUTPATIENT)
Dept: SURGICAL ONCOLOGY | Facility: CLINIC | Age: 55
End: 2020-04-29

## 2020-06-10 ENCOUNTER — TELEPHONE (OUTPATIENT)
Dept: SURGICAL ONCOLOGY | Facility: CLINIC | Age: 55
End: 2020-06-10

## 2021-06-09 ENCOUNTER — HOSPITAL ENCOUNTER (EMERGENCY)
Facility: HOSPITAL | Age: 56
Discharge: HOME | End: 2021-06-09
Attending: EMERGENCY MEDICINE | Admitting: EMERGENCY MEDICINE
Payer: MEDICARE

## 2021-06-09 ENCOUNTER — APPOINTMENT (EMERGENCY)
Dept: CT IMAGING | Facility: HOSPITAL | Age: 56
End: 2021-06-09
Payer: MEDICARE

## 2021-06-09 VITALS
DIASTOLIC BLOOD PRESSURE: 55 MMHG | WEIGHT: 207.23 LBS | HEIGHT: 64 IN | SYSTOLIC BLOOD PRESSURE: 103 MMHG | TEMPERATURE: 98.5 F | OXYGEN SATURATION: 95 % | HEART RATE: 88 BPM | BODY MASS INDEX: 35.38 KG/M2 | RESPIRATION RATE: 20 BRPM

## 2021-06-09 DIAGNOSIS — K62.5 RECTAL BLEEDING: Primary | ICD-10-CM

## 2021-06-09 LAB
ALBUMIN SERPL BCP-MCNC: 3.8 G/DL (ref 3.5–5)
ALP SERPL-CCNC: 92 U/L (ref 46–116)
ALT SERPL W P-5'-P-CCNC: 54 U/L (ref 12–78)
ANION GAP SERPL CALCULATED.3IONS-SCNC: 13 MMOL/L (ref 4–13)
APTT PPP: 34 SECONDS (ref 23–37)
AST SERPL W P-5'-P-CCNC: 19 U/L (ref 5–45)
ATRIAL RATE: 106 BPM
BASOPHILS # BLD MANUAL: 0 THOUSAND/UL (ref 0–0.1)
BASOPHILS NFR MAR MANUAL: 0 % (ref 0–1)
BILIRUB SERPL-MCNC: 0.27 MG/DL (ref 0.2–1)
BUN SERPL-MCNC: 16 MG/DL (ref 5–25)
CALCIUM SERPL-MCNC: 9.3 MG/DL (ref 8.3–10.1)
CHLORIDE SERPL-SCNC: 103 MMOL/L (ref 100–108)
CO2 SERPL-SCNC: 23 MMOL/L (ref 21–32)
CREAT SERPL-MCNC: 0.73 MG/DL (ref 0.6–1.3)
EOSINOPHIL # BLD MANUAL: 0.6 THOUSAND/UL (ref 0–0.4)
EOSINOPHIL NFR BLD MANUAL: 4 % (ref 0–6)
ERYTHROCYTE [DISTWIDTH] IN BLOOD BY AUTOMATED COUNT: 13.9 % (ref 11.6–15.1)
GFR SERPL CREATININE-BSD FRML MDRD: 94 ML/MIN/1.73SQ M
GLUCOSE SERPL-MCNC: 109 MG/DL (ref 65–140)
HCT VFR BLD AUTO: 48.9 % (ref 34.8–46.1)
HGB BLD-MCNC: 16 G/DL (ref 11.5–15.4)
INR PPP: 0.94 (ref 0.84–1.19)
LYMPHOCYTES # BLD AUTO: 25 % (ref 14–44)
LYMPHOCYTES # BLD AUTO: 3.75 THOUSAND/UL (ref 0.6–4.47)
MCH RBC QN AUTO: 28.7 PG (ref 26.8–34.3)
MCHC RBC AUTO-ENTMCNC: 32.7 G/DL (ref 31.4–37.4)
MCV RBC AUTO: 88 FL (ref 82–98)
MONOCYTES # BLD AUTO: 0.6 THOUSAND/UL (ref 0–1.22)
MONOCYTES NFR BLD: 4 % (ref 4–12)
NEUTROPHILS # BLD MANUAL: 9.75 THOUSAND/UL (ref 1.85–7.62)
NEUTS BAND NFR BLD MANUAL: 6 % (ref 0–8)
NEUTS SEG NFR BLD AUTO: 59 % (ref 43–75)
NRBC BLD AUTO-RTO: 0 /100 WBCS
P AXIS: 72 DEGREES
PLATELET # BLD AUTO: 318 THOUSANDS/UL (ref 149–390)
PLATELET BLD QL SMEAR: ADEQUATE
PMV BLD AUTO: 9.4 FL (ref 8.9–12.7)
POTASSIUM SERPL-SCNC: 3.5 MMOL/L (ref 3.5–5.3)
PR INTERVAL: 140 MS
PROT SERPL-MCNC: 7.3 G/DL (ref 6.4–8.2)
PROTHROMBIN TIME: 12.8 SECONDS (ref 11.6–14.5)
QRS AXIS: 67 DEGREES
QRSD INTERVAL: 80 MS
QT INTERVAL: 394 MS
QTC INTERVAL: 523 MS
RBC # BLD AUTO: 5.58 MILLION/UL (ref 3.81–5.12)
RBC MORPH BLD: NORMAL
SODIUM SERPL-SCNC: 139 MMOL/L (ref 136–145)
T WAVE AXIS: 84 DEGREES
TOTAL CELLS COUNTED SPEC: 100
TROPONIN I SERPL-MCNC: <0.02 NG/ML
VARIANT LYMPHS # BLD AUTO: 2 %
VENTRICULAR RATE: 106 BPM
WBC # BLD AUTO: 15 THOUSAND/UL (ref 4.31–10.16)

## 2021-06-09 PROCEDURE — 85610 PROTHROMBIN TIME: CPT | Performed by: EMERGENCY MEDICINE

## 2021-06-09 PROCEDURE — 85730 THROMBOPLASTIN TIME PARTIAL: CPT | Performed by: EMERGENCY MEDICINE

## 2021-06-09 PROCEDURE — 99285 EMERGENCY DEPT VISIT HI MDM: CPT | Performed by: EMERGENCY MEDICINE

## 2021-06-09 PROCEDURE — 99285 EMERGENCY DEPT VISIT HI MDM: CPT

## 2021-06-09 PROCEDURE — 74177 CT ABD & PELVIS W/CONTRAST: CPT

## 2021-06-09 PROCEDURE — 85027 COMPLETE CBC AUTOMATED: CPT | Performed by: EMERGENCY MEDICINE

## 2021-06-09 PROCEDURE — 85007 BL SMEAR W/DIFF WBC COUNT: CPT | Performed by: EMERGENCY MEDICINE

## 2021-06-09 PROCEDURE — 96360 HYDRATION IV INFUSION INIT: CPT

## 2021-06-09 PROCEDURE — 36415 COLL VENOUS BLD VENIPUNCTURE: CPT | Performed by: EMERGENCY MEDICINE

## 2021-06-09 PROCEDURE — 80053 COMPREHEN METABOLIC PANEL: CPT | Performed by: EMERGENCY MEDICINE

## 2021-06-09 PROCEDURE — 93005 ELECTROCARDIOGRAM TRACING: CPT

## 2021-06-09 PROCEDURE — 96361 HYDRATE IV INFUSION ADD-ON: CPT

## 2021-06-09 PROCEDURE — 93010 ELECTROCARDIOGRAM REPORT: CPT | Performed by: INTERNAL MEDICINE

## 2021-06-09 PROCEDURE — 84484 ASSAY OF TROPONIN QUANT: CPT | Performed by: EMERGENCY MEDICINE

## 2021-06-09 RX ADMIN — SODIUM CHLORIDE 1000 ML: 0.9 INJECTION, SOLUTION INTRAVENOUS at 14:20

## 2021-06-09 RX ADMIN — IOHEXOL 100 ML: 350 INJECTION, SOLUTION INTRAVENOUS at 14:57

## 2021-06-09 NOTE — ED PROVIDER NOTES
History  Chief Complaint   Patient presents with    Rectal Bleeding     rectal bleeding x 3 weeks, reports passing clots      Patient is a 58-year-old female past medical history of anxiety, depression, breast cancer, colon cancer, von Willebrand's, seizure disorder, endometriosis presenting with rectal bleeding  Patient states that she has had bright red blood per rectum for the past 3 weeks, bright red blood in the toilet but does not notice it mixed in with the stool and states that over the past 3 days she has started passing clots  She states the bleeding has been gradually worsening over 3 weeks  She does not use any blood thinners and states that she spoke with her gastroenterologist who told her to come to the emergency department as they are not able to get her sooner imaging  She notes chronic right upper quadrant and right-sided low chest pain for several years worsened for the past 2 months but states that she is eating and drinking normally and denies any nausea/vomiting/diarrhea/constipation, rectal pain, rashes, vision changes, dysuria, chest pain, shortness of breath, dizziness  Does note intermittent palpitations which she states she can feel in her neck and ears for 1 year  Prior to Admission Medications   Prescriptions Last Dose Informant Patient Reported? Taking? LORazepam (ATIVAN) 0 5 mg tablet   No No   Sig: Take one tablet 30 minutes prior to MRI  If needed, take one more tablet immediately prior to the MRI  No driving after taking medication     acetaminophen (TYLENOL) 325 mg tablet  Self Yes No   Sig: Take 650 mg by mouth every 6 (six) hours as needed for mild pain   buPROPion (WELLBUTRIN XL) 300 mg 24 hr tablet  Self No No   Sig: Take 1 pill daily in the morning   Patient not taking: Reported on 1/20/2020   cholecalciferol (VITAMIN D3) 1,000 units tablet  Self Yes No   Sig: Take 1,000 Units by mouth daily   cyanocobalamin (VITAMIN B-12) 100 mcg tablet  Self Yes No   Sig: Take by mouth daily   ibuprofen (MOTRIN) 400 mg tablet  Self Yes No   Sig: Take by mouth every 6 (six) hours as needed for mild pain   ipratropium (ATROVENT) 0 06 % nasal spray  Self Yes No   Sig: SPRAY 2 SPRAYS INTO EACH NOSTRIL TWICE A DAY   varenicline (CHANTIX) 1 mg tablet  Self No No   Sig: One pill twice a day   Patient not taking: Reported on 2020      Facility-Administered Medications: None       Past Medical History:   Diagnosis Date    Anxiety     Arthritis     Bleeding disorder ([facility] )     Breast CA ([facility] ) 2009    left     Breast cancer ([facility] ) 190,215    right    Chicken pox     Chronic pain disorder     Colon cancer ([facility] )     Depression     Endometriosis     History of blood transfusion     Migraines     Seizures ([facility] ) 2018    TBI (traumatic brain injury) ([facility] )     no weakness    Von Willebrand disease ([facility] )     [id number] Acquired Mavis Crete now resolved as per hematologist       Past Surgical History:   Procedure Laterality Date    BREAST BIOPSY Right ? benign    BREAST LUMPECTOMY Bilateral  and 2009    BREAST SURGERY       SECTION      COLECTOMY      COLONOSCOPY      CYSTOSCOPY N/A 2019    Procedure: Jenni Fermo;  Surgeon: Jose Borrero MD;  [facility];  Service: Urology    [address] [facility]      EYE SURGERY Right     HYSTERECTOMY  2014    JOINT REPLACEMENT Bilateral ,,    THR    OTHER SURGICAL HISTORY      minimally invasive neuro surgery    [place] SLING OPER STRES INCONTINENCE N/A 2019    Procedure: INSERTION PUBOVAGINAL SLING (FEMALE);   Surgeon: Jose Borrero MD;  [facility];  Service: Urology    SINUS SURGERY      TOTAL HIP ARTHROPLASTY      US GUIDED BREAST BIOPSY RIGHT COMPLETE Right 2020       Family History   Problem Relation Age of Onset    No Known Problems Mother     Heart disease Father     Breast cancer Sister 46    No Known Problems Daughter     No Known Problems Maternal Grandmother     No Known Problems Paternal Grandmother     No Known Problems Sister     No Known Problems Sister     No Known Problems Daughter      I have reviewed and agree with the history as documented  E-Cigarette/Vaping     E-Cigarette/Vaping Substances     Social History     Tobacco Use    Smoking status: Current Every Day Smoker     Packs/day: 1 00     Types: Cigarettes    Smokeless tobacco: Never Used   Substance Use Topics    Alcohol use: No    Drug use: No       Review of Systems   All other systems reviewed and are negative  Physical Exam  Physical Exam  Vitals signs reviewed  Constitutional:       General: She is not in acute distress  Appearance: Normal appearance  She is not ill-appearing  HENT:      Mouth/Throat:      Mouth: Mucous membranes are moist    Eyes:      Conjunctiva/sclera: Conjunctivae normal    Neck:      Musculoskeletal: Neck supple  Cardiovascular:      Rate and Rhythm: Regular rhythm  Tachycardia present  Heart sounds: Normal heart sounds  Pulmonary:      Effort: Pulmonary effort is normal       Breath sounds: Normal breath sounds  Abdominal:      General: Abdomen is flat  Palpations: Abdomen is soft  Tenderness: There is no abdominal tenderness  Musculoskeletal: Normal range of motion  General: No swelling  Skin:     General: Skin is warm and dry  Neurological:      General: No focal deficit present  Mental Status: She is alert     Psychiatric:         Mood and Affect: Mood normal          Vital Signs  ED Triage Vitals [06/09/21 1242]   Temperature Pulse Respirations Blood Pressure SpO2   98 5 °F (36 9 °C) 105 18 141/88 97 %      Temp Source Heart Rate Source Patient Position - Orthostatic VS BP Location FiO2 (%)   Oral Monitor Sitting Left arm --      Pain Score       --           Vitals:    06/09/21 1242 06/09/21 1530   BP: 141/88 103/55   Pulse: 105 88   Patient Position - Orthostatic VS: Sitting          Visual Acuity      ED Medications  Medications   sodium chloride 0 9 % bolus 1,000 mL (0 mL Intravenous Stopped 6/9/21 1553)   iohexol (OMNIPAQUE) 350 MG/ML injection (SINGLE-DOSE) 100 mL (100 mL Intravenous Given 6/9/21 1457)       Diagnostic Studies  Results Reviewed     Procedure Component Value Units Date/Time    Manual Differential(PHLEBS Do Not Order) [[id number]]  (Abnormal) Collected: 06/09/21 1402    Lab Status: Final result Specimen: Blood from Arm, Right Updated: 06/09/21 1509     Segmented % 59 %      Bands % 6 %      Lymphocytes % 25 %      Monocytes % 4 %      Eosinophils, % 4 %      Basophils % 0 %      Atypical Lymphocytes % 2 %      Absolute Neutrophils 9 75 Thousand/uL      Lymphocytes Absolute 3 75 Thousand/uL      Monocytes Absolute 0 60 Thousand/uL      Eosinophils Absolute 0 60 Thousand/uL      Basophils Absolute 0 00 Thousand/uL      Total Counted 100     RBC Morphology Normal     Platelet Estimate Adequate    CBC and differential [[id number]]  (Abnormal) Collected: 06/09/21 1402    Lab Status: Final result Specimen: Blood from Arm, Right Updated: 06/09/21 1509     WBC 15 00 Thousand/uL      RBC 5 58 Million/uL      Hemoglobin 16 0 g/dL      Hematocrit 48 9 %      MCV 88 fL      MCH 28 7 pg      MCHC 32 7 g/dL      RDW 13 9 %      MPV 9 4 fL      Platelets 079 Thousands/uL      nRBC 0 /100 WBCs     Comprehensive metabolic panel [[id number]] Collected: 06/09/21 1402    Lab Status: Final result Specimen: Blood from Arm, Right Updated: 06/09/21 1434     Sodium 139 mmol/L      Potassium 3 5 mmol/L      Chloride 103 mmol/L      CO2 23 mmol/L      ANION GAP 13 mmol/L      BUN 16 mg/dL      Creatinine 0 73 mg/dL      Glucose 109 mg/dL      Calcium 9 3 mg/dL      AST 19 U/L      ALT 54 U/L      Alkaline Phosphatase 92 U/L      Total Protein 7 3 g/dL      Albumin 3 8 g/dL      Total Bilirubin 0 27 mg/dL      eGFR 94 ml/min/1 73sq m     Narrative:      [place] guidelines for Chronic Kidney Disease (CKD):     Stage 1 with normal or high GFR (GFR > 90 mL/min/1 73 square meters)    Stage 2 Mild CKD (GFR = 60-89 mL/min/1 73 square meters)    Stage 3A Moderate CKD (GFR = 45-59 mL/min/1 73 square meters)    Stage 3B Moderate CKD (GFR = 30-44 mL/min/1 73 square meters)    Stage 4 Severe CKD (GFR = 15-29 mL/min/1 73 square meters)    Stage 5 End Stage CKD (GFR <15 mL/min/1 73 square meters)  Note: GFR calculation is accurate only with a steady state creatinine    Troponin I [[id number]]  (Normal) Collected: 06/09/21 1402    Lab Status: Final result Specimen: Blood from Arm, Right Updated: 06/09/21 1432     Troponin I <0 02 ng/mL     Protime-INR [[id number]]  (Normal) Collected: 06/09/21 1402    Lab Status: Final result Specimen: Blood from Arm, Right Updated: 06/09/21 1422     Protime 12 8 seconds      INR 0 94    APTT [[id number]]  (Normal) Collected: 06/09/21 1402    Lab Status: Final result Specimen: Blood from Arm, Right Updated: 06/09/21 1422     PTT 34 seconds                  CT abdomen pelvis with contrast   Final Result by Brenda Barros DO (06/09 1539)      No acute abnormality or soft tissue mass  Hepatic steatosis                 Workstation performed: [id number]                    Procedures  ECG 12 Lead Documentation Only    Date/Time: 6/9/2021 1:53 PM  Performed by: Rashard De Los Santos DO  Authorized by: Rashard De Los Santos DO     Previous ECG:     Previous ECG:  Compared to current    Comparison ECG info:  QTC has lengthened, new anterolateral T-wave flattening  Interpretation:     Interpretation: abnormal    Rate:     ECG rate assessment: tachycardic    Rhythm:     Rhythm: sinus rhythm    Ectopy:     Ectopy: none    QRS:     QRS axis:  Normal    QRS intervals:  Normal  Conduction:     Conduction: normal    ST segments:     ST segments:  Normal  T waves:     T waves: flattening      Flattening:  V6, V5, V4, V2, V3 and aVL             ED Course  ED Course as of Jun 09 1604 Wed Jun 09, 2021   1544 CT, labs unremarkable, hemoglobin 16, 15 6 at last check, will discuss with Dr Jodi Lawrence patient's physician  0 Patient's physicians states okay with plan of outpatient follow-up  MDM  Number of Diagnoses or Management Options  Diagnosis management comments: Patient is a 80-year-old female past medical history of anxiety, depression, breast cancer, colon cancer, von Willebrand's disease, endometriosis, seizure disorder presenting with rectal bleeding  Patient is well-appearing bedside with stable vitals though with low-grade tachycardia and in no acute distress  She has no abdominal tenderness, and no significant physical exam findings of an tachycardia  Will obtain labs, CT abdomen pelvis and discussed disposition with GI if unremarkable  Disposition  Final diagnoses:   Rectal bleeding     Time reflects when diagnosis was documented in both MDM as applicable and the Disposition within this note     Time User Action Codes Description Comment    6/9/2021  3:46 PM Tamika Link Add [K62 [id number]] Rectal bleeding       ED Disposition     ED Disposition Condition Date/Time Comment    Discharge Stable Wed Jun 9, 2021  3:46 PM Mak Mcnair discharge to home/self care              Follow-up Information     Follow up With Specialties Details Why Contact Info    Shasta Uribe MD Colon and Rectal Surgery Schedule an appointment as soon as possible for a visit   [address] [address] [facility]  [phone]            Discharge Medication List as of 6/9/2021  3:46 PM      CONTINUE these medications which have NOT CHANGED    Details   acetaminophen (TYLENOL) 325 mg tablet Take 650 mg by mouth every 6 (six) hours as needed for mild pain, Historical Med      buPROPion (WELLBUTRIN XL) 300 mg 24 hr tablet Take 1 pill daily in the morning, Print      cholecalciferol (VITAMIN D3) 1,000 units tablet Take 1,000 Units by mouth daily, Historical Med      cyanocobalamin (VITAMIN B-12) 100 mcg tablet Take by mouth daily, Historical Med      ibuprofen (MOTRIN) 400 mg tablet Take by mouth every 6 (six) hours as needed for mild pain, Historical Med      ipratropium (ATROVENT) 0 06 % nasal spray SPRAY 2 SPRAYS INTO EACH NOSTRIL TWICE A DAY, Historical Med      LORazepam (ATIVAN) 0 5 mg tablet Take one tablet 30 minutes prior to MRI  If needed, take one more tablet immediately prior to the MRI  No driving after taking medication  , Normal      varenicline (CHANTIX) 1 mg tablet One pill twice a day, Normal           No discharge procedures on file      PDMP Review     None          ED Provider  Electronically Signed by           Darci Paget, DO  06/09/21 3546

## 2023-11-22 ENCOUNTER — APPOINTMENT (OUTPATIENT)
Dept: LAB | Facility: HOSPITAL | Age: 58
End: 2023-11-22
Payer: MEDICARE

## 2023-11-22 DIAGNOSIS — R00.0 SINUS TACHYCARDIA: ICD-10-CM

## 2023-11-22 LAB
ERYTHROCYTE [DISTWIDTH] IN BLOOD BY AUTOMATED COUNT: 15.4 % (ref 11.6–15.1)
HCT VFR BLD AUTO: 33.9 % (ref 34.8–46.1)
HGB BLD-MCNC: 10.5 G/DL (ref 11.5–15.4)
MCH RBC QN AUTO: 26.5 PG (ref 26.8–34.3)
MCHC RBC AUTO-ENTMCNC: 31 G/DL (ref 31.4–37.4)
MCV RBC AUTO: 86 FL (ref 82–98)
PLATELET # BLD AUTO: 348 THOUSANDS/UL (ref 149–390)
PMV BLD AUTO: 8.3 FL (ref 8.9–12.7)
RBC # BLD AUTO: 3.96 MILLION/UL (ref 3.81–5.12)
TSH SERPL DL<=0.05 MIU/L-ACNC: 1.75 UIU/ML (ref 0.45–4.5)
WBC # BLD AUTO: 10.04 THOUSAND/UL (ref 4.31–10.16)

## 2023-11-22 PROCEDURE — 85027 COMPLETE CBC AUTOMATED: CPT

## 2023-11-22 PROCEDURE — 84443 ASSAY THYROID STIM HORMONE: CPT

## 2023-11-22 PROCEDURE — 36415 COLL VENOUS BLD VENIPUNCTURE: CPT

## 2024-02-21 PROBLEM — R05.9 COUGH: Status: RESOLVED | Noted: 2019-05-02 | Resolved: 2024-02-21

## 2024-02-21 PROBLEM — Z01.419 ENCOUNTER FOR GYNECOLOGICAL EXAMINATION WITHOUT ABNORMAL FINDING: Status: RESOLVED | Noted: 2018-09-25 | Resolved: 2024-02-21

## 2024-04-30 ENCOUNTER — TELEPHONE (OUTPATIENT)
Dept: FAMILY MEDICINE CLINIC | Facility: CLINIC | Age: 59
End: 2024-04-30

## 2024-04-30 NOTE — TELEPHONE ENCOUNTER
Called patient to schedule medicare annual wellness visit. Left Voicemail to call clinic and schedule.

## (undated) DEVICE — CYSTO TUBING SINGLE IRRIGATION

## (undated) DEVICE — INTENDED FOR TISSUE SEPARATION, AND OTHER PROCEDURES THAT REQUIRE A SHARP SURGICAL BLADE TO PUNCTURE OR CUT.: Brand: BARD-PARKER SAFETY BLADES SIZE 11, STERILE

## (undated) DEVICE — LIGHT HANDLE COVER SLEEVE DISP BLUE STELLAR

## (undated) DEVICE — MEDI-VAC YANK SUCT HNDL W/TPRD BULBOUS TIP: Brand: CARDINAL HEALTH

## (undated) DEVICE — CURITY PLAIN PACKING STRIP: Brand: CURITY

## (undated) DEVICE — SYRINGE 10ML LL

## (undated) DEVICE — CATH FOLEY 18FR 5ML 2 WAY SILICONE ELASTIMER

## (undated) DEVICE — GLOVE SRG BIOGEL ECLIPSE 7.5

## (undated) DEVICE — PACK TUR

## (undated) DEVICE — MAYO STAND COVER: Brand: CONVERTORS

## (undated) DEVICE — ADHESIVE SKIN HIGH VISCOSITY EXOFIN 1ML

## (undated) DEVICE — GUIDEWIRE STRGHT TIP 0.035 IN  SOLO PLUS

## (undated) DEVICE — STERILE SURGICAL LUBRICANT,  TUBE: Brand: SURGILUBE

## (undated) DEVICE — TUBING SUCTION 5MM X 12 FT

## (undated) DEVICE — SUT VICRYL 2-0 SH 27 IN UNDYED J417H

## (undated) DEVICE — BETHLEHEM UNIVERSAL MINOR VAG: Brand: CARDINAL HEALTH

## (undated) DEVICE — SYRINGE CATH TIP 50ML

## (undated) DEVICE — NEEDLE 22 G X 1 1/2 SAFETY

## (undated) DEVICE — INTENDED FOR TISSUE SEPARATION, AND OTHER PROCEDURES THAT REQUIRE A SHARP SURGICAL BLADE TO PUNCTURE OR CUT.: Brand: BARD-PARKER SAFETY BLADES SIZE 15, STERILE

## (undated) DEVICE — UROCATCH BAG

## (undated) DEVICE — 1820 FOAM BLOCK NEEDLE COUNTER: Brand: DEVON

## (undated) DEVICE — PACKING VAGINAL 2 IN

## (undated) DEVICE — SPECIMEN CONTAINER STERILE PEEL PACK